# Patient Record
Sex: FEMALE | Race: WHITE | NOT HISPANIC OR LATINO | Employment: OTHER | URBAN - METROPOLITAN AREA
[De-identification: names, ages, dates, MRNs, and addresses within clinical notes are randomized per-mention and may not be internally consistent; named-entity substitution may affect disease eponyms.]

---

## 2023-02-07 PROBLEM — R32 URINARY INCONTINENCE: Status: ACTIVE | Noted: 2023-02-07

## 2023-02-07 PROBLEM — M02.30 REACTIVE ARTHRITIS (MULTI): Status: ACTIVE | Noted: 2023-02-07

## 2023-02-07 PROBLEM — R91.1 PULMONARY NODULE: Status: ACTIVE | Noted: 2023-02-07

## 2023-02-07 PROBLEM — E78.5 HYPERLIPIDEMIA: Status: ACTIVE | Noted: 2023-02-07

## 2023-02-07 PROBLEM — T78.3XXA ANGIOEDEMA: Status: ACTIVE | Noted: 2023-02-07

## 2023-02-07 PROBLEM — M35.3 POLYMYALGIA RHEUMATICA (MULTI): Status: ACTIVE | Noted: 2023-02-07

## 2023-02-07 PROBLEM — M51.36 DEGENERATION OF INTERVERTEBRAL DISC OF LUMBAR REGION: Status: ACTIVE | Noted: 2023-02-07

## 2023-02-07 PROBLEM — R21 RASH: Status: ACTIVE | Noted: 2023-02-07

## 2023-02-07 PROBLEM — K62.5 RECTAL BLEEDING: Status: ACTIVE | Noted: 2023-02-07

## 2023-02-07 PROBLEM — N28.9 RENAL INSUFFICIENCY: Status: ACTIVE | Noted: 2023-02-07

## 2023-02-07 PROBLEM — E03.9 HYPOTHYROIDISM: Status: ACTIVE | Noted: 2023-02-07

## 2023-02-07 PROBLEM — R42 DIZZINESS: Status: ACTIVE | Noted: 2023-02-07

## 2023-02-07 PROBLEM — L50.8 ACUTE URTICARIA: Status: ACTIVE | Noted: 2023-02-07

## 2023-02-07 PROBLEM — K52.9 ILEITIS: Status: ACTIVE | Noted: 2023-02-07

## 2023-02-07 PROBLEM — R76.8 ANA POSITIVE: Status: ACTIVE | Noted: 2023-02-07

## 2023-02-07 PROBLEM — S32.020A COMPRESSION FRACTURE OF L2 LUMBAR VERTEBRA (MULTI): Status: ACTIVE | Noted: 2023-02-07

## 2023-02-07 PROBLEM — N39.0 ACUTE UTI: Status: ACTIVE | Noted: 2023-02-07

## 2023-02-07 PROBLEM — M19.049 ARTHROPATHY OF HAND: Status: ACTIVE | Noted: 2023-02-07

## 2023-02-07 PROBLEM — M54.16 CHRONIC LUMBAR RADICULOPATHY: Status: ACTIVE | Noted: 2023-02-07

## 2023-02-07 PROBLEM — R15.9 FECAL INCONTINENCE: Status: ACTIVE | Noted: 2023-02-07

## 2023-02-07 PROBLEM — U07.1 COVID-19: Status: ACTIVE | Noted: 2023-02-07

## 2023-02-07 PROBLEM — M81.0 OSTEOPOROSIS: Status: ACTIVE | Noted: 2023-02-07

## 2023-02-07 PROBLEM — M12.9 ARTHROPATHY: Status: ACTIVE | Noted: 2023-02-07

## 2023-02-07 PROBLEM — Z86.79 HISTORY OF PSVT (PAROXYSMAL SUPRAVENTRICULAR TACHYCARDIA): Status: ACTIVE | Noted: 2023-02-07

## 2023-02-07 PROBLEM — Z86.19 HISTORY OF SEPSIS: Status: ACTIVE | Noted: 2023-02-07

## 2023-02-07 PROBLEM — E55.9 VITAMIN D DEFICIENCY: Status: ACTIVE | Noted: 2023-02-07

## 2023-02-07 PROBLEM — M51.369 DEGENERATION OF INTERVERTEBRAL DISC OF LUMBAR REGION: Status: ACTIVE | Noted: 2023-02-07

## 2023-02-07 PROBLEM — K21.9 GERD (GASTROESOPHAGEAL REFLUX DISEASE): Status: ACTIVE | Noted: 2023-02-07

## 2023-02-07 PROBLEM — M47.816 LUMBAR SPONDYLOSIS: Status: ACTIVE | Noted: 2023-02-07

## 2023-02-07 PROBLEM — A04.9 BACTERIAL ENTEROCOLITIS: Status: ACTIVE | Noted: 2023-02-07

## 2023-02-07 PROBLEM — M47.816 ARTHRITIS OF LUMBAR SPINE: Status: ACTIVE | Noted: 2023-02-07

## 2023-02-07 PROBLEM — K63.8219 SMALL INTESTINAL BACTERIAL OVERGROWTH: Status: ACTIVE | Noted: 2023-02-07

## 2023-02-07 PROBLEM — S99.929A FOOT INJURY: Status: ACTIVE | Noted: 2023-02-07

## 2023-02-07 PROBLEM — R10.9 ABDOMINAL PAIN: Status: ACTIVE | Noted: 2023-02-07

## 2023-02-07 PROBLEM — R00.0 RAPID HEARTBEAT: Status: ACTIVE | Noted: 2023-02-07

## 2023-02-07 PROBLEM — M79.7 FIBROMYALGIA: Status: ACTIVE | Noted: 2023-02-07

## 2023-02-07 PROBLEM — R25.2 CRAMPING OF HANDS: Status: ACTIVE | Noted: 2023-02-07

## 2023-02-07 PROBLEM — M70.62 TROCHANTERIC BURSITIS OF LEFT HIP: Status: ACTIVE | Noted: 2023-02-07

## 2023-02-07 PROBLEM — R92.8 ABNORMAL MAMMOGRAM: Status: ACTIVE | Noted: 2023-02-07

## 2023-02-07 PROBLEM — K52.9 CHRONIC DIARRHEA: Status: ACTIVE | Noted: 2023-02-07

## 2023-02-07 PROBLEM — R19.4 CHANGE IN BOWEL HABITS: Status: ACTIVE | Noted: 2023-02-07

## 2023-02-07 RX ORDER — ESTRADIOL 0.1 MG/G
CREAM VAGINAL
COMMUNITY
Start: 2022-01-04 | End: 2023-12-19

## 2023-02-07 RX ORDER — DEXTROAMPHETAMINE SACCHARATE, AMPHETAMINE ASPARTATE, DEXTROAMPHETAMINE SULFATE AND AMPHETAMINE SULFATE 7.5; 7.5; 7.5; 7.5 MG/1; MG/1; MG/1; MG/1
1 TABLET ORAL DAILY
COMMUNITY

## 2023-02-07 RX ORDER — BUPROPION HYDROCHLORIDE 300 MG/1
1 TABLET ORAL DAILY
COMMUNITY

## 2023-02-07 RX ORDER — TRIMETHOPRIM 100 MG/1
1 TABLET ORAL DAILY
COMMUNITY
Start: 2022-01-04 | End: 2024-05-28

## 2023-02-07 RX ORDER — OMEPRAZOLE 20 MG/1
1 TABLET, DELAYED RELEASE ORAL DAILY
COMMUNITY
Start: 2022-07-01 | End: 2023-11-07 | Stop reason: WASHOUT

## 2023-02-07 RX ORDER — CLONAZEPAM 2 MG/1
1 TABLET ORAL 3 TIMES DAILY
COMMUNITY

## 2023-02-07 RX ORDER — LOPERAMIDE HYDROCHLORIDE 2 MG/1
2 CAPSULE ORAL 4 TIMES DAILY PRN
COMMUNITY
Start: 2020-12-21 | End: 2023-10-18

## 2023-02-07 RX ORDER — LEVOTHYROXINE SODIUM 25 UG/1
1 TABLET ORAL DAILY
COMMUNITY
Start: 2021-10-10

## 2023-02-07 RX ORDER — VIT C/E/ZN/COPPR/LUTEIN/ZEAXAN 250MG-90MG
1 CAPSULE ORAL DAILY
COMMUNITY
Start: 2021-10-08

## 2023-02-07 RX ORDER — MONTELUKAST SODIUM 4 MG/1
2 TABLET, CHEWABLE ORAL 2 TIMES DAILY
COMMUNITY
Start: 2021-12-28 | End: 2023-10-25

## 2023-02-07 RX ORDER — PROPRANOLOL HYDROCHLORIDE 10 MG/1
1 TABLET ORAL DAILY
COMMUNITY
Start: 2019-09-04 | End: 2023-10-26

## 2023-02-07 RX ORDER — DEXTROAMPHETAMINE SACCHARATE, AMPHETAMINE ASPARTATE MONOHYDRATE, DEXTROAMPHETAMINE SULFATE AND AMPHETAMINE SULFATE 6.25; 6.25; 6.25; 6.25 MG/1; MG/1; MG/1; MG/1
25 CAPSULE, EXTENDED RELEASE ORAL DAILY
COMMUNITY
End: 2023-03-20 | Stop reason: ALTCHOICE

## 2023-02-07 RX ORDER — FLUTICASONE PROPIONATE 50 MCG
1-2 SPRAY, SUSPENSION (ML) NASAL DAILY
COMMUNITY
End: 2023-11-07 | Stop reason: WASHOUT

## 2023-02-07 RX ORDER — ROPINIROLE 2 MG/1
1 TABLET, FILM COATED ORAL NIGHTLY
COMMUNITY

## 2023-02-07 RX ORDER — ARMODAFINIL 250 MG/1
250 TABLET ORAL DAILY
COMMUNITY

## 2023-02-07 RX ORDER — PREGABALIN 100 MG/1
100 CAPSULE ORAL DAILY
COMMUNITY

## 2023-02-07 RX ORDER — DULOXETIN HYDROCHLORIDE 60 MG/1
2 CAPSULE, DELAYED RELEASE ORAL NIGHTLY
COMMUNITY

## 2023-02-07 RX ORDER — TRAZODONE HYDROCHLORIDE 50 MG/1
1 TABLET ORAL NIGHTLY
COMMUNITY

## 2023-03-06 PROBLEM — K22.70 BARRETT'S ESOPHAGUS WITHOUT DYSPLASIA: Status: ACTIVE | Noted: 2023-03-06

## 2023-03-20 ENCOUNTER — OFFICE VISIT (OUTPATIENT)
Dept: PRIMARY CARE | Facility: CLINIC | Age: 68
End: 2023-03-20
Payer: MEDICARE

## 2023-03-20 VITALS
HEART RATE: 96 BPM | RESPIRATION RATE: 16 BRPM | BODY MASS INDEX: 25.87 KG/M2 | HEIGHT: 63 IN | OXYGEN SATURATION: 98 % | DIASTOLIC BLOOD PRESSURE: 68 MMHG | WEIGHT: 146 LBS | SYSTOLIC BLOOD PRESSURE: 108 MMHG

## 2023-03-20 DIAGNOSIS — M35.3 POLYMYALGIA RHEUMATICA (MULTI): ICD-10-CM

## 2023-03-20 DIAGNOSIS — R55 SYNCOPE, UNSPECIFIED SYNCOPE TYPE: ICD-10-CM

## 2023-03-20 DIAGNOSIS — F90.9 ATTENTION DEFICIT HYPERACTIVITY DISORDER (ADHD), UNSPECIFIED ADHD TYPE: ICD-10-CM

## 2023-03-20 DIAGNOSIS — Z00.00 MEDICARE ANNUAL WELLNESS VISIT, SUBSEQUENT: Primary | ICD-10-CM

## 2023-03-20 DIAGNOSIS — Z12.31 SCREENING MAMMOGRAM, ENCOUNTER FOR: ICD-10-CM

## 2023-03-20 DIAGNOSIS — K21.9 GASTROESOPHAGEAL REFLUX DISEASE WITHOUT ESOPHAGITIS: ICD-10-CM

## 2023-03-20 DIAGNOSIS — Z13.220 SCREENING, LIPID: ICD-10-CM

## 2023-03-20 PROBLEM — S32.020A COMPRESSION FRACTURE OF L2 LUMBAR VERTEBRA (MULTI): Status: RESOLVED | Noted: 2023-02-07 | Resolved: 2023-03-20

## 2023-03-20 PROBLEM — U07.1 COVID-19: Status: RESOLVED | Noted: 2023-02-07 | Resolved: 2023-03-20

## 2023-03-20 PROCEDURE — 99214 OFFICE O/P EST MOD 30 MIN: CPT | Performed by: STUDENT IN AN ORGANIZED HEALTH CARE EDUCATION/TRAINING PROGRAM

## 2023-03-20 PROCEDURE — 1170F FXNL STATUS ASSESSED: CPT | Performed by: STUDENT IN AN ORGANIZED HEALTH CARE EDUCATION/TRAINING PROGRAM

## 2023-03-20 PROCEDURE — 4004F PT TOBACCO SCREEN RCVD TLK: CPT | Performed by: STUDENT IN AN ORGANIZED HEALTH CARE EDUCATION/TRAINING PROGRAM

## 2023-03-20 PROCEDURE — 1160F RVW MEDS BY RX/DR IN RCRD: CPT | Performed by: STUDENT IN AN ORGANIZED HEALTH CARE EDUCATION/TRAINING PROGRAM

## 2023-03-20 PROCEDURE — 1159F MED LIST DOCD IN RCRD: CPT | Performed by: STUDENT IN AN ORGANIZED HEALTH CARE EDUCATION/TRAINING PROGRAM

## 2023-03-20 PROCEDURE — G0439 PPPS, SUBSEQ VISIT: HCPCS | Performed by: STUDENT IN AN ORGANIZED HEALTH CARE EDUCATION/TRAINING PROGRAM

## 2023-03-20 RX ORDER — DEXTROAMPHETAMINE SACCHARATE, AMPHETAMINE ASPARTATE MONOHYDRATE, DEXTROAMPHETAMINE SULFATE AND AMPHETAMINE SULFATE 6.25; 6.25; 6.25; 6.25 MG/1; MG/1; MG/1; MG/1
25 CAPSULE, EXTENDED RELEASE ORAL DAILY
Start: 2023-03-20 | End: 2023-03-20 | Stop reason: ENTERED-IN-ERROR

## 2023-03-20 RX ORDER — OMEPRAZOLE 20 MG/1
1 CAPSULE, DELAYED RELEASE ORAL DAILY
COMMUNITY
Start: 2022-10-17 | End: 2023-11-07 | Stop reason: WASHOUT

## 2023-03-20 RX ORDER — ZOLEDRONIC ACID 5 MG/100ML
INJECTION, SOLUTION INTRAVENOUS
COMMUNITY

## 2023-03-20 RX ORDER — DEXTROAMPHETAMINE SACCHARATE, AMPHETAMINE ASPARTATE, DEXTROAMPHETAMINE SULFATE AND AMPHETAMINE SULFATE 3.75; 3.75; 3.75; 3.75 MG/1; MG/1; MG/1; MG/1
TABLET ORAL
COMMUNITY
Start: 2023-02-16 | End: 2023-03-20 | Stop reason: ALTCHOICE

## 2023-03-20 ASSESSMENT — PATIENT HEALTH QUESTIONNAIRE - PHQ9
1. LITTLE INTEREST OR PLEASURE IN DOING THINGS: NOT AT ALL
1. LITTLE INTEREST OR PLEASURE IN DOING THINGS: NOT AT ALL
SUM OF ALL RESPONSES TO PHQ9 QUESTIONS 1 AND 2: 0
2. FEELING DOWN, DEPRESSED OR HOPELESS: NOT AT ALL
2. FEELING DOWN, DEPRESSED OR HOPELESS: NOT AT ALL
SUM OF ALL RESPONSES TO PHQ9 QUESTIONS 1 AND 2: 0

## 2023-03-20 ASSESSMENT — ENCOUNTER SYMPTOMS
ROS GI COMMENTS: REFLUX
PALPITATIONS: 0
SEIZURES: 0
DIARRHEA: 1
DECREASED CONCENTRATION: 1
NERVOUS/ANXIOUS: 1
SLEEP DISTURBANCE: 1
DEPRESSION: 0
NAUSEA: 1
FACIAL ASYMMETRY: 0
DIZZINESS: 0
FATIGUE: 1
CONFUSION: 0
COUGH: 0
ACTIVITY CHANGE: 0
LOSS OF SENSATION IN FEET: 0
APPETITE CHANGE: 0
SPEECH DIFFICULTY: 0
DYSPHORIC MOOD: 1
SHORTNESS OF BREATH: 0
NUMBNESS: 0
OCCASIONAL FEELINGS OF UNSTEADINESS: 0

## 2023-03-20 ASSESSMENT — ACTIVITIES OF DAILY LIVING (ADL)
DRESSING: INDEPENDENT
TAKING_MEDICATION: INDEPENDENT
BATHING: INDEPENDENT
MANAGING_FINANCES: INDEPENDENT
DOING_HOUSEWORK: INDEPENDENT
GROCERY_SHOPPING: INDEPENDENT

## 2023-03-20 NOTE — ASSESSMENT & PLAN NOTE
No further episodes  No seizure like activity warranting EEG  Has been cleared by cardiology dept-normal stress  Discussion of medications and the need to discuss with psychiatry and sleep teams  Given hx of hypersomnia and use of multiple stimulant and sedating agents leading differential is polypharmacy  Recommendations to have someone drive her until workup complete
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
## 2023-03-20 NOTE — ASSESSMENT & PLAN NOTE
Mammogram ordered  Colonoscopy due 1/24/2024  Encouraged zoster vaccine- agrees  Declines PCV20  Declines Bone Density Scan  Due for lipid panel, ordered   Patient declines 6 month follow up wants to do annual

## 2023-03-20 NOTE — ASSESSMENT & PLAN NOTE
Increase omeprazole to 40mg  EGD upcoming  Lifestyle modifications discussed with patient including:   Decreasing exacerbating foods including citric, acidic, spicy foods  Elevated head of the bed structurally or with pillows  Not eating 2 hours before laying down  Long term potential risks of PPIs discussed including C diff, pneumonia, B12 deficiency discussed

## 2023-04-04 ENCOUNTER — HOSPITAL ENCOUNTER (OUTPATIENT)
Dept: DATA CONVERSION | Facility: HOSPITAL | Age: 68
End: 2023-04-04
Attending: INTERNAL MEDICINE | Admitting: INTERNAL MEDICINE
Payer: MEDICARE

## 2023-04-04 DIAGNOSIS — F32.A DEPRESSION, UNSPECIFIED: ICD-10-CM

## 2023-04-04 DIAGNOSIS — R12 HEARTBURN: ICD-10-CM

## 2023-04-04 DIAGNOSIS — F41.9 ANXIETY DISORDER, UNSPECIFIED: ICD-10-CM

## 2023-04-04 DIAGNOSIS — M79.7 FIBROMYALGIA: ICD-10-CM

## 2023-04-04 DIAGNOSIS — R42 DIZZINESS AND GIDDINESS: ICD-10-CM

## 2023-04-04 DIAGNOSIS — Z90.49 ACQUIRED ABSENCE OF OTHER SPECIFIED PARTS OF DIGESTIVE TRACT: ICD-10-CM

## 2023-04-04 DIAGNOSIS — F90.9 ATTENTION-DEFICIT HYPERACTIVITY DISORDER, UNSPECIFIED TYPE: ICD-10-CM

## 2023-04-04 DIAGNOSIS — G47.00 INSOMNIA, UNSPECIFIED: ICD-10-CM

## 2023-04-04 DIAGNOSIS — F17.200 NICOTINE DEPENDENCE, UNSPECIFIED, UNCOMPLICATED: ICD-10-CM

## 2023-04-04 DIAGNOSIS — G25.81 RESTLESS LEGS SYNDROME: ICD-10-CM

## 2023-04-04 DIAGNOSIS — E78.5 HYPERLIPIDEMIA, UNSPECIFIED: ICD-10-CM

## 2023-04-04 DIAGNOSIS — K21.9 GASTRO-ESOPHAGEAL REFLUX DISEASE WITHOUT ESOPHAGITIS: ICD-10-CM

## 2023-04-04 DIAGNOSIS — E03.9 HYPOTHYROIDISM, UNSPECIFIED: ICD-10-CM

## 2023-04-04 DIAGNOSIS — K29.50 UNSPECIFIED CHRONIC GASTRITIS WITHOUT BLEEDING: ICD-10-CM

## 2023-04-11 LAB
COMPLETE PATHOLOGY REPORT: NORMAL
CONVERTED CLINICAL DIAGNOSIS-HISTORY: NORMAL
CONVERTED FINAL DIAGNOSIS: NORMAL
CONVERTED FINAL REPORT PDF LINK TO COPY AND PASTE: NORMAL
CONVERTED GROSS DESCRIPTION: NORMAL

## 2023-08-17 LAB
ALANINE AMINOTRANSFERASE (SGPT) (U/L) IN SER/PLAS: 21 U/L (ref 7–45)
ALBUMIN (G/DL) IN SER/PLAS: 4.1 G/DL (ref 3.4–5)
ALKALINE PHOSPHATASE (U/L) IN SER/PLAS: 88 U/L (ref 33–136)
ANION GAP IN SER/PLAS: 12 MMOL/L (ref 10–20)
ASPARTATE AMINOTRANSFERASE (SGOT) (U/L) IN SER/PLAS: 16 U/L (ref 9–39)
BILIRUBIN TOTAL (MG/DL) IN SER/PLAS: 0.3 MG/DL (ref 0–1.2)
C REACTIVE PROTEIN (MG/L) IN SER/PLAS: 1.17 MG/DL
CALCIDIOL (25 OH VITAMIN D3) (NG/ML) IN SER/PLAS: 24 NG/ML
CALCIUM (MG/DL) IN SER/PLAS: 9.3 MG/DL (ref 8.6–10.3)
CARBON DIOXIDE, TOTAL (MMOL/L) IN SER/PLAS: 27 MMOL/L (ref 21–32)
CHLORIDE (MMOL/L) IN SER/PLAS: 104 MMOL/L (ref 98–107)
CREATININE (MG/DL) IN SER/PLAS: 0.85 MG/DL (ref 0.5–1.05)
GFR FEMALE: 75 ML/MIN/1.73M2
GLUCOSE (MG/DL) IN SER/PLAS: 88 MG/DL (ref 74–99)
POTASSIUM (MMOL/L) IN SER/PLAS: 4.5 MMOL/L (ref 3.5–5.3)
PROTEIN TOTAL: 6.6 G/DL (ref 6.4–8.2)
SEDIMENTATION RATE, ERYTHROCYTE: 14 MM/H (ref 0–30)
SODIUM (MMOL/L) IN SER/PLAS: 138 MMOL/L (ref 136–145)
UREA NITROGEN (MG/DL) IN SER/PLAS: 15 MG/DL (ref 6–23)

## 2023-08-18 LAB — PARATHYRIN INTACT (PG/ML) IN SER/PLAS: 77.8 PG/ML (ref 18.5–88)

## 2023-09-06 VITALS — BODY MASS INDEX: 24.41 KG/M2 | WEIGHT: 137.79 LBS | HEIGHT: 63 IN

## 2023-10-18 DIAGNOSIS — K21.9 GASTROESOPHAGEAL REFLUX DISEASE WITHOUT ESOPHAGITIS: ICD-10-CM

## 2023-10-18 DIAGNOSIS — K59.1 FUNCTIONAL DIARRHEA: Primary | ICD-10-CM

## 2023-10-18 DIAGNOSIS — R15.1 FECAL SOILING: Primary | ICD-10-CM

## 2023-10-18 DIAGNOSIS — Z86.79 HISTORY OF PSVT (PAROXYSMAL SUPRAVENTRICULAR TACHYCARDIA): Primary | ICD-10-CM

## 2023-10-18 RX ORDER — LOPERAMIDE HYDROCHLORIDE 2 MG/1
CAPSULE ORAL
Qty: 80 CAPSULE | Refills: 0 | Status: SHIPPED | OUTPATIENT
Start: 2023-10-18 | End: 2023-12-13

## 2023-10-25 RX ORDER — MONTELUKAST SODIUM 4 MG/1
2 TABLET, CHEWABLE ORAL 2 TIMES DAILY
Qty: 120 TABLET | Refills: 0 | Status: SHIPPED | OUTPATIENT
Start: 2023-10-25 | End: 2023-10-31

## 2023-10-25 RX ORDER — PANTOPRAZOLE SODIUM 40 MG/1
40 TABLET, DELAYED RELEASE ORAL DAILY
Qty: 30 TABLET | Refills: 0 | Status: SHIPPED | OUTPATIENT
Start: 2023-10-25 | End: 2023-12-08 | Stop reason: SDUPTHER

## 2023-10-26 RX ORDER — PROPRANOLOL HYDROCHLORIDE 10 MG/1
10 TABLET ORAL DAILY
Qty: 90 TABLET | Refills: 0 | Status: SHIPPED | OUTPATIENT
Start: 2023-10-26 | End: 2024-02-19 | Stop reason: SDUPTHER

## 2023-11-02 DIAGNOSIS — R15.9 FULL INCONTINENCE OF FECES: ICD-10-CM

## 2023-11-02 RX ORDER — DIPHENOXYLATE HYDROCHLORIDE AND ATROPINE SULFATE 2.5; .025 MG/1; MG/1
1 TABLET ORAL 2 TIMES DAILY PRN
Qty: 60 TABLET | Refills: 0 | Status: SHIPPED | OUTPATIENT
Start: 2023-11-02 | End: 2023-12-02

## 2023-11-06 NOTE — PROGRESS NOTES
[facility]: GENERAL SURGERY  PROGRESS NOTE      Sabina Chopra is a 68 y.o. female that is presenting today for Follow-up.    ASSESSMENT / PLAN:  Diagnoses and all orders for this visit:  Full incontinence of feces    Believe she has reached maximal medical treatment  Patient refuses referral to pelvic floor although I believe this in addition to augmentation of her InterStim may help with her control.    InterStim is currently off, she has an appointment with her urologist and the rep to see if they can find a setting that improves her symptoms.  Believes enemas may be too difficult to perform as needed  Not interested in ostomy as an alternative at this time  She is interested in the possibility of a sphincteroplasty however is aware that there is a high short-term failure rate.  I recommended she be evaluated a pelvic floor specialty center if this is something she is interested in pursuing.    Patient Active Problem List   Diagnosis    Abdominal pain    Abnormal mammogram    Acute urticaria    Acute UTI    SERENA positive    Angioedema    Arthropathy of hand    Arthropathy    Bacterial enterocolitis    Change in bowel habits    Fecal incontinence    Urinary incontinence    Chronic diarrhea    Cramping of hands    Degeneration of intervertebral disc of lumbar region    Dizziness    Foot injury    GERD (gastroesophageal reflux disease)    History of PSVT (paroxysmal supraventricular tachycardia)    History of sepsis    Hyperlipidemia    Hypothyroidism    Ileitis    Arthritis of lumbar spine    Chronic lumbar radiculopathy    Lumbar spondylosis    Osteoporosis    Fibromyalgia    Polymyalgia rheumatica (CMS/HCC)    Pulmonary nodule    Rapid heartbeat    Rash    Rectal bleeding    Renal insufficiency    Small intestinal bacterial overgrowth    Trochanteric bursitis of left hip    Vitamin D deficiency    Rose's esophagus without dysplasia    Medicare annual wellness visit, subsequent    Syncope     Subjective    Sabina Chopra is a 68 year old female with history of fecal incontinence.  Previously examined by Dr. Lopez at the [place] office.  History of total abdominal colectomy with ANABELA in 2008 for colonic dysmotility.  Developed incontinence after surgery.  InterStim placement 2009.  She reported good results but developed an infection and had to have the InterStim removed.  Had a new sacral nerve modulator placed 2021 but had suboptimal results.    Colonoscopy 2019. Apthous ulcers in the neoterminal ileum, evidence of a prior end to end ileorectal anastomosis at 20 cm to the anus. Diminutive polyp found in the rectum.  Pathology:   Terminal ileum, biopsy:  small bowel mucosa, chronic enteritis/ileitis with focal acute inflammation, and lamina propria fibrosis with overlying re-epithelialization consistent with healing ulcer bed, no dysplasia.  Rectal polyp:  tubular adenoma.    Ongoing episodes of fecal incontinence.  Experiences these multiple times per week.  No warning or sensation before evacuation of stool.  Still taking colestipol Imodium and Lomotil.  Has an appointment regarding her current InterStim which is turned off about adjusting settings for better improvement.  Review of Systems   All other systems reviewed and are negative.     Objective   Vitals:    11/07/23 1413   BP: 111/88   Pulse: 77   Temp: 36.1 °C (97 °F)      Physical Exam  Constitutional:       Appearance: Normal appearance.   HENT:      Head: Normocephalic.   Cardiovascular:      Rate and Rhythm: Normal rate and regular rhythm.      Pulses: Normal pulses.   Pulmonary:      Effort: Pulmonary effort is normal.   Abdominal:      General: Bowel sounds are normal.      Palpations: Abdomen is soft.   Musculoskeletal:         General: Normal range of motion.   Skin:     General: Skin is warm.   Neurological:      General: No focal deficit present.      Mental Status: She is alert.   Psychiatric:         Mood and Affect: Mood normal.         Behavior:  "Behavior normal.         Diagnostic Results   Lab Results   Component Value Date    GLUCOSE 88 08/17/2023    CALCIUM 9.3 08/17/2023     08/17/2023    K 4.5 08/17/2023    CO2 27 08/17/2023     08/17/2023    BUN 15 08/17/2023    CREATININE 0.85 08/17/2023     Lab Results   Component Value Date    ALT 21 08/17/2023    AST 16 08/17/2023    ALKPHOS 88 08/17/2023    BILITOT 0.3 08/17/2023     Lab Results   Component Value Date    WBC 9.7 12/27/2022    HGB 13.7 12/27/2022    HCT 42.8 12/27/2022     (H) 12/27/2022     12/27/2022     No results found for: \"CHOL\"  No results found for: \"HDL\"  No results found for: \"LDLCALC\"  No results found for: \"TRIG\"  No components found for: \"CHOLHDL\"  No results found for: \"HGBA1C\"  Other labs not included in the list above were reviewed either before or during this encounter.    History    Past Medical History:   Diagnosis Date    Compression fracture of L2 lumbar vertebra (CMS/HCC) 02/07/2023    COVID-19 02/07/2023    Hypersomnia, unspecified     Hypersomnia    Personal history of other diseases of the digestive system     History of colitis    Personal history of other diseases of the musculoskeletal system and connective tissue     History of fibromyalgia    Personal history of other diseases of the musculoskeletal system and connective tissue     History of arthritis    Personal history of other diseases of the nervous system and sense organs     History of restless legs syndrome    Personal history of other endocrine, nutritional and metabolic disease     History of thyroid disorder    Personal history of other mental and behavioral disorders     History of anxiety    Personal history of other mental and behavioral disorders     History of depression    Personal history of other specified conditions     History of insomnia    Personal history of other specified conditions     History of incontinence of feces    Syncope and collapse 09/04/2019    Syncope " and collapse     Past Surgical History:   Procedure Laterality Date    OTHER SURGICAL HISTORY  11/19/2018    Hernia repair    OTHER SURGICAL HISTORY  11/19/2018    Elbow surgery    OTHER SURGICAL HISTORY  11/19/2018    Hand surgery    OTHER SURGICAL HISTORY  11/14/2018    Colectomy total     Family History   Problem Relation Name Age of Onset    Diabetes Sister      Breast cancer Other grandmother      Allergies   Allergen Reactions    Adhesive Unknown and Rash     Adhesive bandages MISC    Cefaclor Unknown    Famotidine Unknown    Hydromorphone Unknown    Metronidazole Unknown    Sulfamethoxazole-Trimethoprim Rash     Current Outpatient Medications on File Prior to Visit   Medication Sig Dispense Refill    amphetamine-dextroamphetamine (Adderall) 30 mg tablet Take 1 tablet (30 mg) by mouth once daily.      armodafinil (Nuvigil) 250 mg tablet Take 1 tablet (250 mg) by mouth once daily.      buPROPion XL (Wellbutrin XL) 300 mg 24 hr tablet Take 1 tablet (300 mg) by mouth once daily.      cholecalciferol (Vitamin D-3) 25 MCG (1000 UT) capsule Take 1 capsule (25 mcg) by mouth once daily.      clonazePAM (KlonoPIN) 2 mg tablet Take 1 tablet (2 mg) by mouth 3 times a day.      colestipol (Colestid) 1 gram tablet TAKE 2 TABLETS BY MOUTH TWICE DAILY 120 tablet 0    diphenoxylate-atropine (LomotiL) 2.5-0.025 mg tablet Take 1 tablet by mouth 2 times a day as needed for diarrhea. 60 tablet 0    DULoxetine (Cymbalta) 60 mg DR capsule Take 2 capsules (120 mg) by mouth once daily at bedtime.      estradiol (Estrace) 0.01 % (0.1 mg/gram) vaginal cream Insert into the vagina. INSERT pea size amount INTRAVAGINALLY AT BEDTIME NIGHTLY x 14 days; then 2 times per week      levothyroxine (Synthroid, Levoxyl) 25 mcg tablet Take 1 tablet (25 mcg) by mouth once daily.      loperamide (Imodium) 2 mg capsule TAKE ONE CAPSULE BY MOUTH FOUR TIMES A DAY AS NEEDED 80 capsule 0    pantoprazole (ProtoNix) 40 mg EC tablet TAKE ONE TABLET BY MOUTH  DAILY 30 tablet 0    pregabalin (Lyrica) 100 mg capsule Take 1 capsule (100 mg) by mouth once daily.      propranolol (Inderal) 10 mg tablet TAKE ONE TABLET BY MOUTH EVERY DAY 90 tablet 0    rOPINIRole (Requip) 2 mg tablet Take 1 tablet (2 mg) by mouth once daily at bedtime.      traZODone (Desyrel) 50 mg tablet Take 1 tablet (50 mg) by mouth once daily at bedtime.      trimethoprim (Trimpex) 100 mg tablet Take 1 tablet (100 mg) by mouth once daily.      fluticasone (Flonase) 50 mcg/actuation nasal spray Administer 1-2 sprays into each nostril once daily.      omeprazole (PriLOSEC) 20 mg DR capsule Take 1 capsule (20 mg) by mouth once daily.      omeprazole OTC (PriLOSEC OTC) 20 mg EC tablet Take 1 tablet (20 mg) by mouth once daily.      triamcinolone acetonide (KENALOG) 40 mg/mL kit 1 mL (40 mg). Every 4 weeks      zoledronic acid (Reclast) 5 mg/100 mL piggyback Infuse into a venous catheter.       No current facility-administered medications on file prior to visit.     Immunization History   Administered Date(s) Administered    Flu vaccine, quadrivalent, high-dose, preservative free, age 65y+ (FLUZONE) 11/10/2020    Influenza, High Dose Seasonal, Preservative Free 11/23/2021    Influenza, injectable, MDCK, quadrivalent 10/25/2019    Influenza, injectable, quadrivalent 09/25/2017    Influenza, seasonal, injectable 10/07/2016, 12/21/2022    Influenza, seasonal, injectable, preservative free 10/15/2018    Pfizer Purple Cap SARS-CoV-2 03/11/2021, 04/15/2021    Pneumococcal conjugate vaccine, 13-valent (PREVNAR 13) 11/10/2020    Tdap vaccine, age 7 year and older (BOOSTRIX) 08/29/2019, 08/04/2022

## 2023-11-07 ENCOUNTER — OFFICE VISIT (OUTPATIENT)
Dept: SURGERY | Facility: CLINIC | Age: 68
End: 2023-11-07
Payer: MEDICARE

## 2023-11-07 VITALS
BODY MASS INDEX: 24.45 KG/M2 | TEMPERATURE: 97 F | WEIGHT: 138 LBS | SYSTOLIC BLOOD PRESSURE: 111 MMHG | DIASTOLIC BLOOD PRESSURE: 88 MMHG | HEART RATE: 77 BPM

## 2023-11-07 DIAGNOSIS — R15.9 FULL INCONTINENCE OF FECES: Primary | ICD-10-CM

## 2023-11-07 PROCEDURE — 1160F RVW MEDS BY RX/DR IN RCRD: CPT | Performed by: SURGERY

## 2023-11-07 PROCEDURE — 99213 OFFICE O/P EST LOW 20 MIN: CPT | Performed by: SURGERY

## 2023-11-07 PROCEDURE — 1125F AMNT PAIN NOTED PAIN PRSNT: CPT | Performed by: SURGERY

## 2023-11-07 PROCEDURE — 1159F MED LIST DOCD IN RCRD: CPT | Performed by: SURGERY

## 2023-11-07 PROCEDURE — 4004F PT TOBACCO SCREEN RCVD TLK: CPT | Performed by: SURGERY

## 2023-11-14 ENCOUNTER — OFFICE VISIT (OUTPATIENT)
Dept: UROLOGY | Facility: CLINIC | Age: 68
End: 2023-11-14
Payer: MEDICARE

## 2023-11-14 DIAGNOSIS — R15.9 INCONTINENCE OF FECES, UNSPECIFIED FECAL INCONTINENCE TYPE: Primary | ICD-10-CM

## 2023-11-14 PROCEDURE — 1159F MED LIST DOCD IN RCRD: CPT | Performed by: STUDENT IN AN ORGANIZED HEALTH CARE EDUCATION/TRAINING PROGRAM

## 2023-11-14 PROCEDURE — 99215 OFFICE O/P EST HI 40 MIN: CPT | Performed by: STUDENT IN AN ORGANIZED HEALTH CARE EDUCATION/TRAINING PROGRAM

## 2023-11-14 PROCEDURE — 4004F PT TOBACCO SCREEN RCVD TLK: CPT | Performed by: STUDENT IN AN ORGANIZED HEALTH CARE EDUCATION/TRAINING PROGRAM

## 2023-11-14 PROCEDURE — 1125F AMNT PAIN NOTED PAIN PRSNT: CPT | Performed by: STUDENT IN AN ORGANIZED HEALTH CARE EDUCATION/TRAINING PROGRAM

## 2023-11-14 PROCEDURE — 1160F RVW MEDS BY RX/DR IN RCRD: CPT | Performed by: STUDENT IN AN ORGANIZED HEALTH CARE EDUCATION/TRAINING PROGRAM

## 2023-11-14 PROCEDURE — 95971 ALYS SMPL SP/PN NPGT W/PRGRM: CPT | Performed by: STUDENT IN AN ORGANIZED HEALTH CARE EDUCATION/TRAINING PROGRAM
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
## 2023-12-07 ENCOUNTER — APPOINTMENT (OUTPATIENT)
Dept: CARDIOLOGY | Facility: CLINIC | Age: 68
End: 2023-12-07
Payer: MEDICARE

## 2023-12-07 DIAGNOSIS — R15.1 FECAL SOILING: ICD-10-CM

## 2023-12-08 ENCOUNTER — OFFICE VISIT (OUTPATIENT)
Dept: GASTROENTEROLOGY | Facility: CLINIC | Age: 68
End: 2023-12-08
Payer: MEDICARE

## 2023-12-08 VITALS
HEART RATE: 97 BPM | SYSTOLIC BLOOD PRESSURE: 113 MMHG | DIASTOLIC BLOOD PRESSURE: 80 MMHG | WEIGHT: 142 LBS | BODY MASS INDEX: 25.15 KG/M2

## 2023-12-08 DIAGNOSIS — K21.9 GASTROESOPHAGEAL REFLUX DISEASE WITHOUT ESOPHAGITIS: ICD-10-CM

## 2023-12-08 DIAGNOSIS — E73.9 ADULT LACTASE DEFICIENCY: ICD-10-CM

## 2023-12-08 DIAGNOSIS — K21.9 GASTROESOPHAGEAL REFLUX DISEASE WITHOUT ESOPHAGITIS: Primary | ICD-10-CM

## 2023-12-08 DIAGNOSIS — K59.1 FUNCTIONAL DIARRHEA: ICD-10-CM

## 2023-12-08 PROCEDURE — 1159F MED LIST DOCD IN RCRD: CPT | Performed by: INTERNAL MEDICINE

## 2023-12-08 PROCEDURE — 1160F RVW MEDS BY RX/DR IN RCRD: CPT | Performed by: INTERNAL MEDICINE

## 2023-12-08 PROCEDURE — 1125F AMNT PAIN NOTED PAIN PRSNT: CPT | Performed by: INTERNAL MEDICINE

## 2023-12-08 PROCEDURE — 99214 OFFICE O/P EST MOD 30 MIN: CPT | Performed by: INTERNAL MEDICINE

## 2023-12-08 PROCEDURE — 4004F PT TOBACCO SCREEN RCVD TLK: CPT | Performed by: INTERNAL MEDICINE

## 2023-12-08 RX ORDER — PANTOPRAZOLE SODIUM 40 MG/1
40 TABLET, DELAYED RELEASE ORAL DAILY
Qty: 30 TABLET | Refills: 0 | Status: SHIPPED | OUTPATIENT
Start: 2023-12-08 | End: 2024-02-09

## 2023-12-08 RX ORDER — MONTELUKAST SODIUM 4 MG/1
2 TABLET, CHEWABLE ORAL 2 TIMES DAILY
Qty: 120 TABLET | Refills: 0 | Status: SHIPPED | OUTPATIENT
Start: 2023-12-08 | End: 2024-02-09

## 2023-12-08 NOTE — PROGRESS NOTES
Subjective   Patient ID: Sabina Chopra is a 68 y.o. female who presents for Follow-up.  HPI  The patient has a history of fecal incontinence for which she is seeing Dr. Santillan , urogynecologist who is planning placement of device for control of her symptoms.  She also has ongoing difficulties with heartburn occasionally occurring at night over the past several months.  Her symptoms have been generally controlled on pantoprazole 40 mg daily but she has occasional breakthrough symptoms.  In our visit today she also mentioned symptoms of difficulties with diarrhea bloating and cramping after consumption of dairy products.  Descriptively her complaints are consistent with possible lactase deficiency.    Review of Systems  Constitutional: no fever, no chills, fatigue,  not feeling tired and no recent weight loss. No reports of dizziness.  SKIN: No skin rash or lesions  EYE: No pain photophobia, diplopia or blurred vision  EAR: No discharge, pain or hearing loss  NOSE: No congestion, epistaxis or obstruction  RESPIRATORY: No cough , dyspnea or  chest pain   CV: No chest pain, lower extremity swelling or palpitations  GE: No abdominal pain, nausea, vomiting, dysphagia or odynophagia. Denied constipation , diarrhea  : No dysuria or hematuria, urinary incontinence or urine frequency  NEURO: Denied weakness, confusion, dizziness, or numbness   PSYCH : Denies anxiety, hallucinations or insomnia  HEME/ LYMPH : Denied bleeding , bruising or enlarged nodes  ENDOCRINE: No change in weight, polydipsia, or abnormal bleeding  .       Objective   Physical Exam  Head and neck examinations were  unremarkable. There was no temporal wasting. No jaundice noted. No thyromegaly.  No carotid bruits.  There is no peripheral lymphadenopathy.  Lungs were clear to auscultation. There when no rales, rhonchi or wheezes.  Cardiac examination revealed normal heart sounds. Rhythm was sinus . There were no murmurs.  Abdomen was full and soft. There  was no organomegaly. Bowel sounds were normo- active. There was no ascites. The abdomen was nontender.  Rectal examination was not done.  Extremities: No edema or joint swelling.  Neurological examination: Patient was alert, oriented in person place, and time. There when no focal neurological signs. Cranial nerves were grossly intact. No evidence of encephalopathy. There was no asterixis. The  plantar response was flexor.      Assessment/Plan   68-year-old lady with symptoms of fecal incontinence occurring status post subtotal colectomy with ileorectal anastomosis done in 2007 for chronic constipation.  She is being seen by a urogynecologist and there are plans for placement of rectal pessary and possible anal bulking procedure for control of her incontinence.    Today she also described difficulties with consumption of dairy products and I recommended use of  LACTAID pills 2 to 3 tablets as needed with consumption of dairy foods.    I also instructed her on routine antireflux measures including avoidance of cigarette smoking, limiting intake of alcohol, caffeine, peppermints, chocolate, and acidic products such as tomato sauce and juices and also oranges.    She was advised to return to see me 2 to 3 months after her planned procedure with a urogynecologist.           Titus Artis MD 12/08/23 11:04 AM

## 2023-12-13 RX ORDER — LOPERAMIDE HYDROCHLORIDE 2 MG/1
CAPSULE ORAL
Qty: 80 CAPSULE | Refills: 0 | Status: SHIPPED | OUTPATIENT
Start: 2023-12-13

## 2023-12-19 ENCOUNTER — OFFICE VISIT (OUTPATIENT)
Dept: UROLOGY | Facility: CLINIC | Age: 68
End: 2023-12-19
Payer: MEDICARE

## 2023-12-19 DIAGNOSIS — R15.9 INCONTINENCE OF FECES, UNSPECIFIED FECAL INCONTINENCE TYPE: ICD-10-CM

## 2023-12-19 DIAGNOSIS — N95.8 GENITOURINARY SYNDROME OF MENOPAUSE: Primary | ICD-10-CM

## 2023-12-19 PROCEDURE — 1160F RVW MEDS BY RX/DR IN RCRD: CPT | Performed by: STUDENT IN AN ORGANIZED HEALTH CARE EDUCATION/TRAINING PROGRAM

## 2023-12-19 PROCEDURE — 57160 INSERT PESSARY/OTHER DEVICE: CPT | Performed by: STUDENT IN AN ORGANIZED HEALTH CARE EDUCATION/TRAINING PROGRAM

## 2023-12-19 PROCEDURE — 1125F AMNT PAIN NOTED PAIN PRSNT: CPT | Performed by: STUDENT IN AN ORGANIZED HEALTH CARE EDUCATION/TRAINING PROGRAM

## 2023-12-19 PROCEDURE — 99214 OFFICE O/P EST MOD 30 MIN: CPT | Performed by: STUDENT IN AN ORGANIZED HEALTH CARE EDUCATION/TRAINING PROGRAM

## 2023-12-19 PROCEDURE — 4004F PT TOBACCO SCREEN RCVD TLK: CPT | Performed by: STUDENT IN AN ORGANIZED HEALTH CARE EDUCATION/TRAINING PROGRAM

## 2023-12-19 PROCEDURE — 1159F MED LIST DOCD IN RCRD: CPT | Performed by: STUDENT IN AN ORGANIZED HEALTH CARE EDUCATION/TRAINING PROGRAM

## 2023-12-19 RX ORDER — ESTRADIOL 0.1 MG/G
CREAM VAGINAL
Qty: 42.5 G | Refills: 12 | Status: SHIPPED | OUTPATIENT
Start: 2023-12-19

## 2023-12-19 NOTE — PROGRESS NOTES
CHIEF COMPLAINT: FUV Fecal pessary           HISTORY OF PRESENT ILLNESS:  This is a 68 y.o. female,  who presents with a follow up visit for a fecal pessary. Patient is here to learn how to use a fecal pessary today. Patient advised to give it about 6 - 8 pumps when not needing to have a bowel movement. Patient will need to deflate the pessary when she needs to have a bowel movement. Patient is taking the Colestipol daily with 2 mg in the morning and evening each and she is using Imodium too.            HISTORY OF PRESENT ILLNESS:           Past Medical History  She has a past medical history of Compression fracture of L2 lumbar vertebra (CMS/HCC) (02/07/2023), COVID-19 (02/07/2023), Hypersomnia, unspecified, Personal history of other diseases of the digestive system, Personal history of other diseases of the musculoskeletal system and connective tissue, Personal history of other diseases of the musculoskeletal system and connective tissue, Personal history of other diseases of the nervous system and sense organs, Personal history of other endocrine, nutritional and metabolic disease, Personal history of other mental and behavioral disorders, Personal history of other mental and behavioral disorders, Personal history of other specified conditions, Personal history of other specified conditions, and Syncope and collapse (09/04/2019).    Surgical History  She has a past surgical history that includes Other surgical history (11/19/2018); Other surgical history (11/19/2018); Other surgical history (11/19/2018); and Other surgical history (11/14/2018).     Social History  She reports that she has been smoking cigarettes. She has been exposed to tobacco smoke. She has never used smokeless tobacco. No history on file for alcohol use and drug use.    Family History  Family History   Problem Relation Name Age of Onset    Diabetes Sister      Breast cancer Other grandmother         Allergies  Adhesive, Cefaclor, Famotidine,  "Hydromorphone, Metronidazole, and Sulfamethoxazole-trimethoprim      A comprehensive 10+ review of systems was negative except for: see hpi                          PHYSICAL EXAMINATION:  BP Readings from Last 3 Encounters:   12/08/23 113/80   11/07/23 111/88   07/21/23 (!) 143/94      Wt Readings from Last 3 Encounters:   12/08/23 64.4 kg (142 lb)   11/07/23 62.6 kg (138 lb)   07/21/23 64.7 kg (142 lb 9.6 oz)      BMI: Estimated body mass index is 25.15 kg/m² as calculated from the following:    Height as of 7/21/23: 1.6 m (5' 3\").    Weight as of 12/8/23: 64.4 kg (142 lb).  BSA: Estimated body surface area is 1.69 meters squared as calculated from the following:    Height as of 7/21/23: 1.6 m (5' 3\").    Weight as of 12/8/23: 64.4 kg (142 lb).  HEENT: Normocephalic, atraumatic, PER EOMI, nonicteric, trachea normal, thyroid normal, oropharynx normal.  CARDIAC: regular rate & rhythm, S1 & S2 normal.  No heaves, thrills, gallops or murmurs.  LUNGS: Clear to auscultation, no spinal or CV tenderness.  EXTREMITIES: No evidence of cyanosis, clubbing or edema.          Assessment:      68-year-old with fecal incontinence occurring status post subtotal colectomy with ileorectal anastomosis in 2007 for chronic constipation, recurrent UTI, urinary urge incontinence     FI:  moderate improvement with colestipol 1 mg bid and metamucil  increase to 2 mg bid, on loperamide and lomotil  Negligible improvement with SNM  s/p battery change to non-rechargeable, working ok, she bumped up stimulation today     Continue Colestipol 2 mg bid   Fitted with an Eclipse pessary  Follow up in 2-3 weeks     Shaniqua:  contnue daily trimethoprim   continue vaginal estrogen  no d-mannose, as may worsen FI, could consider methenamine if needed      UUI:  no improvement with oxybutynin  Failed gemtesa  Improved significantly with Fareye device       Osmel Santillan MD  "

## 2023-12-29 ENCOUNTER — TELEPHONE (OUTPATIENT)
Dept: UROLOGY | Facility: CLINIC | Age: 68
End: 2023-12-29
Payer: MEDICARE

## 2023-12-29 DIAGNOSIS — N95.8 GENITOURINARY SYNDROME OF MENOPAUSE: ICD-10-CM

## 2023-12-29 DIAGNOSIS — R30.0 DYSURIA: Primary | ICD-10-CM

## 2023-12-29 RX ORDER — PHENAZOPYRIDINE HYDROCHLORIDE 200 MG/1
200 TABLET, FILM COATED ORAL 3 TIMES DAILY
Qty: 10 TABLET | Refills: 0 | Status: SHIPPED | OUTPATIENT
Start: 2023-12-29 | End: 2024-01-08

## 2024-01-02 ENCOUNTER — TELEPHONE (OUTPATIENT)
Dept: UROLOGY | Facility: CLINIC | Age: 69
End: 2024-01-02

## 2024-01-02 ENCOUNTER — APPOINTMENT (OUTPATIENT)
Dept: CARDIOLOGY | Facility: CLINIC | Age: 69
End: 2024-01-02
Payer: MEDICARE

## 2024-01-02 NOTE — TELEPHONE ENCOUNTER
Blanka Schafer needs a clarification on pyridium prescription. SIG says to take 1 tablet 3 times a day beltran 10 days but the quantity is only 10 pills. Please clarify. Thank you.

## 2024-01-09 ENCOUNTER — OFFICE VISIT (OUTPATIENT)
Dept: RHEUMATOLOGY | Facility: CLINIC | Age: 69
End: 2024-01-09
Payer: MEDICARE

## 2024-01-09 ENCOUNTER — LAB (OUTPATIENT)
Dept: LAB | Facility: LAB | Age: 69
End: 2024-01-09
Payer: MEDICARE

## 2024-01-09 VITALS
TEMPERATURE: 98.4 F | HEART RATE: 94 BPM | WEIGHT: 138 LBS | BODY MASS INDEX: 24.45 KG/M2 | DIASTOLIC BLOOD PRESSURE: 67 MMHG | SYSTOLIC BLOOD PRESSURE: 98 MMHG | HEIGHT: 63 IN

## 2024-01-09 DIAGNOSIS — M35.3 PMR (POLYMYALGIA RHEUMATICA) (MULTI): Primary | ICD-10-CM

## 2024-01-09 DIAGNOSIS — M35.3 PMR (POLYMYALGIA RHEUMATICA) (MULTI): ICD-10-CM

## 2024-01-09 PROCEDURE — 36415 COLL VENOUS BLD VENIPUNCTURE: CPT

## 2024-01-09 PROCEDURE — 86431 RHEUMATOID FACTOR QUANT: CPT

## 2024-01-09 PROCEDURE — 86225 DNA ANTIBODY NATIVE: CPT

## 2024-01-09 PROCEDURE — 99214 OFFICE O/P EST MOD 30 MIN: CPT | Performed by: INTERNAL MEDICINE

## 2024-01-09 PROCEDURE — 1160F RVW MEDS BY RX/DR IN RCRD: CPT | Performed by: INTERNAL MEDICINE

## 2024-01-09 PROCEDURE — 86235 NUCLEAR ANTIGEN ANTIBODY: CPT

## 2024-01-09 PROCEDURE — 1159F MED LIST DOCD IN RCRD: CPT | Performed by: INTERNAL MEDICINE

## 2024-01-09 PROCEDURE — 86038 ANTINUCLEAR ANTIBODIES: CPT

## 2024-01-09 PROCEDURE — 1125F AMNT PAIN NOTED PAIN PRSNT: CPT | Performed by: INTERNAL MEDICINE

## 2024-01-09 RX ORDER — TRIAMCINOLONE ACETONIDE 40 MG/ML
40 INJECTION, SUSPENSION INTRA-ARTICULAR; INTRAMUSCULAR ONCE
Status: COMPLETED | OUTPATIENT
Start: 2024-01-09 | End: 2024-05-28

## 2024-01-09 ASSESSMENT — PAIN SCALES - GENERAL: PAINLEVEL: 6

## 2024-01-09 NOTE — PROGRESS NOTES
Informants: Patient and EMR.  PP: 68 year-old female with history of gastroesophageal reflux disorder, osteoporosis, reactive arthritis, status post colectomy for hypomotility, polymyalgia rheumatica.  CC: Generalized musculoskeletal pain involving the knees, elbows, neck, thoracic region, and lumbar region of the spine.  [place]: She initially had developed pain in her right wrist and left ankle in 2013. At that time she was found to have staph aureus bacteremia and septic arthritis of the right wrist requiring surgical debridement of the right wrist. Since that time she has been having pain involving multiple joints with periodic exacerbations. She currently notes pain in her neck and in the MCP joint of the second digit of the left hand.  She is planning to start physical therapy for the neck pain. She underwent colectomy around 2005 secondary to hypomotility of the colon. She has a history of intestinal fistulas and vaginal fistulas. She has been treated with antibiotics for small bowel bacterial overgrowth. For 1 year she had recurrent blisters initially involving the left supraclavicular region, mouth, and then spreading to involve the lower extremities and more recently involving her hands and forearms. She was evaluated by dermatology and had skin biopsy without etiology of the rash. She had intermittent swelling of her hands. She has morning stiffness. She had blurred vision due to cataracts and has 1 eye is farsighted and the other eye is nearsighted. She has a history of osteoporosis. She has fatigue despite taking stimulants for treatment of sleep disorder. She has history of positive SERENA with negative CHAD panel and positive HLA-B27.She was treated recently with monthly intramuscular injections of triamcinolone for treatment of polymyalgia rheumatica.  The triamcinolone injections were discontinued because of weight gain.  She received intravenous infusion of zoledronic acid 9/29/2023 and 11/4/2021 for  treatment of osteoporosis.  PH: Allergies: Bactrim, cefaclor, Dilaudid, Flagyl, pantoprazole, intravenous Pepcid (hallucinations); illnesses: Gastroesophageal reflux disorder, bacterial overgrowth, osteoporosis (never received the Reclast infusion due to cancellation with Covid virus pandemic at the time), angioedema, fibromyalgia, staph aureus septic arthritis of the right wrist () with staph aureus bacteremia with septicemia, positive SERENA with negative CHAD panel, positive HLA-B27, hyperlipidemia, palpitations, bilateral cataracts; surgeries: Cholectomy ( secondary to slow transit), right elbow surgery for tennis elbow, right hand surgery for septic arthritis, lumbar nerve block, hernia repair.  SH: Tobacco: He smokes half pack of cigarettes per week but formerly smoked 1 pack/day. She drinks alcohol occasionally. She denies illicit drug use.  FH: Her father  at 79 years of age and had heart disease and C. difficile colitis. Her mother is alive at 88 years of age and has C1/C2 fracture secondary to fall, rheumatoid arthritis and multiple other arthritis is. She has a half brother with ulcerative colitis. She has a sister with arthritis. She has another sister who  of with motor vehicle accident and had diabetes mellitus. She has a half sister who  with drug overdose. She has a son who is healthy. She has a daughter with systemic lupus erythematosus. She has no other daughter with gluten sensitivity. She has a grandmother who had breast cancer.  PX: She is a well-developed, well-nourished, white female. The lungs, heart, abdomen, and extremities are benign. The musculoskeletal examination shows well-healed surgical scar with extensor aspect of the right hand.  There is tenderness over the left second MCP joint with preserved range of motion.. There are ovoid hyperpigmented macules scattered over the lower extremities.  DEXA bone density (2021) L1-L4 T-score is 0.0, left femoral neck T-score  -1.8, left total femur T-score -1.6, right femoral neck T-score -2.1, right total femur T-score -2.1.  2D echocardiogram (1/10/2023) left ventricular ejection fraction 60%, trace mitral and tricuspid regurgitation, physiologic pulmonic regurgitation.  There is no pericardial effusion.  X-ray hands (11/13/2019) normal.  Laboratory (5/20/2020) BUN 22, creatinine 0.82, glucose 95, ALT 19, AST 16, alkaline phosphatase 81, TPMT 27.9, WBC 8.3, hemoglobin 12.8, hematocrit 39.7, , MCHC 32.2, platelets 291, (11/13/2019) HLA-B27 positive, SERENA 1: 160, CHAD panel negative, CCP <1, RF <10, uric acid 3.3, C4 50, C3 135.  Impression: 68 year-old white female with history of reactive arthritis secondary to staph aureus septicemia and septic arthritis of the right hand was recently noted to have recurrent rashes on her forearms and lower extremities with a history of positive SERENA with negative CHAD panel and positive HLA-B27 with family history of mother having rheumatoid arthritis and a daughter with systemic lupus erythematosus. She has history of total colectomy with chronic diarrhea with small bowel bacterial overgrowth phenomena.  Plan: She is to have laboratory for ESR, CRP, rheumatoid factor, and SERENA panel.  She was given intramuscular injection of triamcinolone 40 mg.  Continue with plans for repeat zoledronic acid infusion after 9/29/2024.  She is to have repeat bone mineral density study after the zoledronic acid infusion.

## 2024-01-10 LAB
ANA PATTERN: ABNORMAL
ANA SER QL HEP2 SUBST: POSITIVE
ANA TITR SER IF: ABNORMAL {TITER}
CENTROMERE B AB SER-ACNC: <0.2 AI
CHROMATIN AB SERPL-ACNC: 0.2 AI
DSDNA AB SER-ACNC: 1 IU/ML
ENA JO1 AB SER QL IA: <0.2 AI
ENA RNP AB SER IA-ACNC: <0.2 AI
ENA SCL70 AB SER QL IA: <0.2 AI
ENA SM AB SER IA-ACNC: <0.2 AI
ENA SM+RNP AB SER QL IA: <0.2 AI
ENA SS-A AB SER IA-ACNC: <0.2 AI
ENA SS-B AB SER IA-ACNC: <0.2 AI
RHEUMATOID FACT SER NEPH-ACNC: <10 IU/ML (ref 0–15)
RIBOSOMAL P AB SER-ACNC: <0.2 AI

## 2024-01-19 ENCOUNTER — TELEMEDICINE (OUTPATIENT)
Dept: UROLOGY | Facility: CLINIC | Age: 69
End: 2024-01-19
Payer: MEDICARE

## 2024-01-19 DIAGNOSIS — N32.81 OAB (OVERACTIVE BLADDER): Primary | ICD-10-CM

## 2024-01-24 ENCOUNTER — APPOINTMENT (OUTPATIENT)
Dept: CARDIOLOGY | Facility: CLINIC | Age: 69
End: 2024-01-24
Payer: MEDICARE

## 2024-02-05 DIAGNOSIS — R15.9 INCONTINENCE OF FECES, UNSPECIFIED FECAL INCONTINENCE TYPE: Primary | ICD-10-CM

## 2024-02-05 DIAGNOSIS — K21.9 GASTROESOPHAGEAL REFLUX DISEASE WITHOUT ESOPHAGITIS: ICD-10-CM

## 2024-02-05 DIAGNOSIS — N32.81 OAB (OVERACTIVE BLADDER): Primary | ICD-10-CM

## 2024-02-05 DIAGNOSIS — K59.1 FUNCTIONAL DIARRHEA: ICD-10-CM

## 2024-02-05 DIAGNOSIS — Z86.79 HISTORY OF PSVT (PAROXYSMAL SUPRAVENTRICULAR TACHYCARDIA): ICD-10-CM

## 2024-02-05 RX ORDER — DIPHENOXYLATE HYDROCHLORIDE AND ATROPINE SULFATE 2.5; .025 MG/1; MG/1
1 TABLET ORAL 4 TIMES DAILY PRN
Qty: 120 TABLET | Refills: 5 | Status: SHIPPED | OUTPATIENT
Start: 2024-02-05 | End: 2025-02-04

## 2024-02-05 RX ORDER — LOPERAMIDE HYDROCHLORIDE 2 MG/1
CAPSULE ORAL
Qty: 80 CAPSULE | Refills: 0 | Status: SHIPPED | OUTPATIENT
Start: 2024-02-05 | End: 2024-04-15

## 2024-02-09 DIAGNOSIS — K21.9 GASTROESOPHAGEAL REFLUX DISEASE WITHOUT ESOPHAGITIS: ICD-10-CM

## 2024-02-09 DIAGNOSIS — K52.9 CHRONIC DIARRHEA: Primary | ICD-10-CM

## 2024-02-09 RX ORDER — PANTOPRAZOLE SODIUM 40 MG/1
40 TABLET, DELAYED RELEASE ORAL
Qty: 30 TABLET | Refills: 5 | Status: SHIPPED | OUTPATIENT
Start: 2024-02-09

## 2024-02-09 RX ORDER — MONTELUKAST SODIUM 4 MG/1
2 TABLET, CHEWABLE ORAL 2 TIMES DAILY
Qty: 120 TABLET | Refills: 0 | Status: SHIPPED | OUTPATIENT
Start: 2024-02-09

## 2024-02-09 RX ORDER — MONTELUKAST SODIUM 4 MG/1
1 TABLET, CHEWABLE ORAL
Qty: 180 TABLET | Refills: 6 | Status: SHIPPED | OUTPATIENT
Start: 2024-02-09 | End: 2024-05-31 | Stop reason: SDUPTHER

## 2024-02-09 RX ORDER — PANTOPRAZOLE SODIUM 40 MG/1
40 TABLET, DELAYED RELEASE ORAL DAILY
Qty: 30 TABLET | Refills: 0 | Status: SHIPPED | OUTPATIENT
Start: 2024-02-09

## 2024-02-12 RX ORDER — PROPRANOLOL HYDROCHLORIDE 10 MG/1
10 TABLET ORAL DAILY
Qty: 90 TABLET | Refills: 0 | OUTPATIENT
Start: 2024-02-12

## 2024-02-19 DIAGNOSIS — Z86.79 HISTORY OF PSVT (PAROXYSMAL SUPRAVENTRICULAR TACHYCARDIA): ICD-10-CM

## 2024-02-20 ENCOUNTER — OFFICE VISIT (OUTPATIENT)
Dept: UROLOGY | Facility: CLINIC | Age: 69
End: 2024-02-20
Payer: MEDICARE

## 2024-02-20 DIAGNOSIS — N32.81 OAB (OVERACTIVE BLADDER): Primary | ICD-10-CM

## 2024-02-20 DIAGNOSIS — R15.9 INCONTINENCE OF FECES, UNSPECIFIED FECAL INCONTINENCE TYPE: ICD-10-CM

## 2024-02-20 DIAGNOSIS — N39.0 RECURRENT UTI: ICD-10-CM

## 2024-02-20 PROCEDURE — 1125F AMNT PAIN NOTED PAIN PRSNT: CPT | Performed by: STUDENT IN AN ORGANIZED HEALTH CARE EDUCATION/TRAINING PROGRAM

## 2024-02-20 PROCEDURE — 1159F MED LIST DOCD IN RCRD: CPT | Performed by: STUDENT IN AN ORGANIZED HEALTH CARE EDUCATION/TRAINING PROGRAM

## 2024-02-20 PROCEDURE — 99214 OFFICE O/P EST MOD 30 MIN: CPT | Performed by: STUDENT IN AN ORGANIZED HEALTH CARE EDUCATION/TRAINING PROGRAM

## 2024-02-20 PROCEDURE — 1160F RVW MEDS BY RX/DR IN RCRD: CPT | Performed by: STUDENT IN AN ORGANIZED HEALTH CARE EDUCATION/TRAINING PROGRAM

## 2024-02-20 RX ORDER — PROPRANOLOL HYDROCHLORIDE 10 MG/1
10 TABLET ORAL DAILY
Qty: 30 TABLET | Refills: 0 | Status: SHIPPED | OUTPATIENT
Start: 2024-02-20 | End: 2024-06-04

## 2024-03-01 ENCOUNTER — APPOINTMENT (OUTPATIENT)
Dept: UROLOGY | Facility: CLINIC | Age: 69
End: 2024-03-01
Payer: MEDICARE

## 2024-03-14 PROBLEM — M85.80 OSTEOPENIA: Status: ACTIVE | Noted: 2024-03-14

## 2024-03-14 PROBLEM — G47.00 INSOMNIA: Status: ACTIVE | Noted: 2023-04-04

## 2024-03-14 PROBLEM — F41.9 ANXIETY DISORDER: Status: ACTIVE | Noted: 2023-04-04

## 2024-03-14 PROBLEM — K92.9 DISORDER OF DIGESTIVE TRACT: Status: ACTIVE | Noted: 2023-04-04

## 2024-03-14 PROBLEM — R92.8 ABNORMAL MAMMOGRAM: Status: RESOLVED | Noted: 2023-02-07 | Resolved: 2024-03-14

## 2024-03-14 PROBLEM — M65.949 FLEXOR TENOSYNOVITIS OF FINGER: Status: ACTIVE | Noted: 2017-04-14

## 2024-03-14 PROBLEM — B36.9 CUTANEOUS FUNGAL INFECTION: Status: ACTIVE | Noted: 2024-03-14

## 2024-03-14 PROBLEM — M02.30 REACTIVE ARTHRITIS (MULTI): Status: ACTIVE | Noted: 2018-04-26

## 2024-03-14 PROBLEM — E73.9 NONPERSISTENCE OF INTESTINAL LACTASE: Status: ACTIVE | Noted: 2024-03-14

## 2024-03-14 PROBLEM — G47.10 HYPERSOMNIA: Status: ACTIVE | Noted: 2024-03-14

## 2024-03-14 PROBLEM — F32.A DEPRESSIVE DISORDER: Status: ACTIVE | Noted: 2024-03-14

## 2024-03-14 PROBLEM — R07.9 CHEST PAIN: Status: ACTIVE | Noted: 2023-01-10

## 2024-03-14 PROBLEM — M70.60 GREATER TROCHANTERIC BURSITIS: Status: ACTIVE | Noted: 2024-03-14

## 2024-03-14 PROBLEM — G25.81 RESTLESS LEGS SYNDROME: Status: ACTIVE | Noted: 2023-04-04

## 2024-03-14 PROBLEM — R12 HEARTBURN: Status: ACTIVE | Noted: 2023-04-04

## 2024-03-14 PROBLEM — Z86.39 HISTORY OF THYROID DISORDER: Status: ACTIVE | Noted: 2024-03-14

## 2024-03-14 PROBLEM — G47.14 HYPERSOMNIA DUE TO MEDICAL CONDITION: Status: ACTIVE | Noted: 2020-11-02

## 2024-03-14 PROBLEM — R19.8 ALTERED BOWEL FUNCTION: Status: ACTIVE | Noted: 2024-03-14

## 2024-03-14 PROBLEM — Z20.822 CONTACT WITH AND (SUSPECTED) EXPOSURE TO COVID-19: Status: ACTIVE | Noted: 2022-07-20

## 2024-03-14 PROBLEM — N39.0 ACUTE URINARY TRACT INFECTION: Status: ACTIVE | Noted: 2023-02-07

## 2024-03-14 PROBLEM — S22.39XA FRACTURE OF RIB: Status: ACTIVE | Noted: 2024-03-14

## 2024-03-14 PROBLEM — F17.200 NICOTINE DEPENDENCE: Status: ACTIVE | Noted: 2023-04-04

## 2024-03-14 PROBLEM — F90.9 ATTENTION DEFICIT HYPERACTIVITY DISORDER (ADHD): Status: ACTIVE | Noted: 2023-04-04

## 2024-03-14 PROBLEM — R76.8 POSITIVE ANTINUCLEAR ANTIBODY: Status: ACTIVE | Noted: 2022-06-23

## 2024-03-14 PROBLEM — A04.9: Status: ACTIVE | Noted: 2023-02-07

## 2024-03-14 PROBLEM — M65.9 FLEXOR TENOSYNOVITIS OF FINGER: Status: ACTIVE | Noted: 2017-04-14

## 2024-03-21 ENCOUNTER — APPOINTMENT (OUTPATIENT)
Dept: PRIMARY CARE | Facility: CLINIC | Age: 69
End: 2024-03-21
Payer: MEDICARE

## 2024-03-22 ENCOUNTER — PATIENT MESSAGE (OUTPATIENT)
Dept: INFUSION THERAPY | Facility: CLINIC | Age: 69
End: 2024-03-22
Payer: MEDICARE

## 2024-04-05 ENCOUNTER — HOSPITAL ENCOUNTER (OUTPATIENT)
Dept: RADIOLOGY | Facility: CLINIC | Age: 69
Discharge: HOME | End: 2024-04-05
Payer: MEDICARE

## 2024-04-05 ENCOUNTER — OFFICE VISIT (OUTPATIENT)
Dept: ORTHOPEDIC SURGERY | Facility: CLINIC | Age: 69
End: 2024-04-05
Payer: MEDICARE

## 2024-04-05 DIAGNOSIS — M25.579 ANKLE PAIN, UNSPECIFIED CHRONICITY, UNSPECIFIED LATERALITY: ICD-10-CM

## 2024-04-05 DIAGNOSIS — S82.841A BIMALLEOLAR ANKLE FRACTURE, RIGHT, CLOSED, INITIAL ENCOUNTER: Primary | ICD-10-CM

## 2024-04-05 PROCEDURE — 1159F MED LIST DOCD IN RCRD: CPT | Performed by: ORTHOPAEDIC SURGERY

## 2024-04-05 PROCEDURE — 27808 TREATMENT OF ANKLE FRACTURE: CPT | Performed by: ORTHOPAEDIC SURGERY

## 2024-04-05 PROCEDURE — 73610 X-RAY EXAM OF ANKLE: CPT | Mod: RIGHT SIDE | Performed by: RADIOLOGY

## 2024-04-05 PROCEDURE — 73610 X-RAY EXAM OF ANKLE: CPT | Mod: RT

## 2024-04-05 PROCEDURE — 1160F RVW MEDS BY RX/DR IN RCRD: CPT | Performed by: ORTHOPAEDIC SURGERY

## 2024-04-05 PROCEDURE — 99213 OFFICE O/P EST LOW 20 MIN: CPT | Mod: 57,25 | Performed by: ORTHOPAEDIC SURGERY

## 2024-04-05 PROCEDURE — L3260 AMBULATORY SURGICAL BOOT EAC: HCPCS | Performed by: ORTHOPAEDIC SURGERY

## 2024-04-05 PROCEDURE — 99203 OFFICE O/P NEW LOW 30 MIN: CPT | Performed by: ORTHOPAEDIC SURGERY

## 2024-04-05 RX ORDER — ACETAMINOPHEN AND CODEINE PHOSPHATE 300; 30 MG/1; MG/1
1 TABLET ORAL EVERY 6 HOURS PRN
Qty: 30 TABLET | Refills: 0 | Status: CANCELLED | OUTPATIENT
Start: 2024-04-05 | End: 2024-04-12

## 2024-04-05 RX ORDER — NALOXONE HYDROCHLORIDE 4 MG/.1ML
4 SPRAY NASAL AS NEEDED
Qty: 2 EACH | Refills: 0 | Status: SHIPPED | OUTPATIENT
Start: 2024-04-05

## 2024-04-05 NOTE — PROGRESS NOTES
This 68-year-old woman was seen with her sister for right ankle injury sustained on March 24, 2024 when she fell off a curb.  The patient noted the acute onset of pain swelling and ecchymosis in her ankle.  She went to an urgent care center where she was placed in a removable boot and referred for definitive treatment.    The patient notes she is having pain both medially and laterally about the ankle.  Patient notes the removable boot is not very comfortable.  The patient denies any previous injuries to her right ankle or any neurologic symptoms in the lower extremity.    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    The patient smokes cigarettes.    Review of systems:  A complete review of the patient's past medical history and review of 30 systems and all medications and allergies was performed. Please see adult medical record for details.    A Family history for genetic or familial inheritance issues and Social history including smoking history, alcohol consumption and exercise activities was also reviewed and significant findings noted in the patients history of present illness.    Physical Exam:  The patient is well-nourished and well-developed and in no acute distress. The patient displayed normal mood and affect. The patient's pupils were equal, round sclerae are white. Patient's respirations appear to be regular and unlabored.     Examination right ankle was performed.  There was swelling, ecchymosis and bruising over the medial and lateral malleolus.  The skin and neurovascular examination of the foot was intact. The neurological  exam including motor and sensory exam was performed. The vascular examination including palpation of pulses and capillary refill of the foot was performed and determined to be intact.   There was tenderness to palpation over the medial and lateral malleolus.  There was no significant tenderness over the anterior or posterior ankle.  There was no gross instability of the foot or ankle.  There was no lymphangitis.  Subtalar and midtarsal motion was intact.  Menjivar test was negative for Achilles tendon rupture.  Calf was soft, nontender and there were no palpable cords.    Imaging:  I personally reviewed and measured the radiographs including AP and lateral views of the extremity and they revealed nondisplaced medial malleolus fracture with intra-articular extension and a nondisplaced lateral malleolus fracture.  Mortise is well-preserved.    Impression & Plan:   It is my impression this patient has sustained a nondisplaced bimalleolar fracture of her right ankle.  Alignment is acceptable.  The mortise is intact.    Patient placed in a fiberglass short leg walking cast.  She should only weight-bear for necessity.    The patient was advised to avoid nonsteroidal anti-inflammatory medications and the use of any nicotinic products including smoking cigarettes.  Avoidance of these drugs will decrease the risk of healing complications.  We discussed proper nutrition including calcium and protein.  I advised the patient to take a daily multiple vitamin.    I have specifically explained to the patient who was planning to fly on a trip within a month that I would strongly recommend against flying in an airplane because of the risk of fatal pulmonary embolism.    I would like to see the patient back in 1 month with an x-ray AP lateral mortise view of her ankle prior to being seen.      Note dictated with "[facility]" software.  Completed without full type editing and sent to avoid delay.    Add addendum:  Patient notes Tylenol is not helping her with her pain and she does not have anything else to take.  I prescribed Tylenol 3 30 tablets 1 p.o. every 4-6 hours as needed pain.  The patient understands this is an addicting medication and she should take it only when she is having severe pain.      Note dictated with [employer] software.  Completed without full type editing and sent to avoid delay.

## 2024-04-05 NOTE — LETTER
April 5, 2024     Ileana Mclaughlin DO  [address]  [facility] [address]    Patient: Sabina Chopra   YOB: 1955   Date of Visit: 4/5/2024       Dear Dr. Ileana Mclaughlin DO:    Thank you for referring Sabina Chopra to me for evaluation. Below are my notes for this consultation.  If you have questions, please do not hesitate to call me. I look forward to following your patient along with you.       Sincerely,     Nestor Villegas MD      CC: No Recipients  ______________________________________________________________________________________    This 68-year-old woman was seen with her sister for right ankle injury sustained on March 24, 2024 when she fell off a curb.  The patient noted the acute onset of pain swelling and ecchymosis in her ankle.  She went to an urgent care center where she was placed in a removable boot and referred for definitive treatment.    The patient notes she is having pain both medially and laterally about the ankle.  Patient notes the removable boot is not very comfortable.  The patient denies any previous injuries to her right ankle or any neurologic symptoms in the lower extremity.    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    The patient smokes cigarettes.    Review of systems:  A complete review of the patient's past medical history and review of 30 systems and all medications and allergies was performed. Please see adult medical record for details.    A Family history for genetic or familial inheritance issues and Social history including smoking history,  alcohol consumption and exercise activities was also reviewed and significant findings noted in the patients history of present illness.    Physical Exam:  The patient is well-nourished and well-developed and in no acute distress. The patient displayed normal mood and affect. The patient's pupils were equal, round sclerae are white. Patient's respirations appear to be regular and unlabored.     Examination right ankle was performed.  There was swelling, ecchymosis and bruising over the medial and lateral malleolus.  The skin and neurovascular examination of the foot was intact. The neurological exam including motor and sensory exam was performed. The vascular examination including palpation of pulses and capillary refill of the foot was performed and determined to be intact.   There was tenderness to palpation over the medial and lateral malleolus.  There was no significant tenderness over the anterior or posterior ankle.  There was no gross instability of the foot or ankle.  There was no lymphangitis.  Subtalar and midtarsal motion was intact.  Menjivar test was negative for Achilles tendon rupture.  Calf was soft, nontender and there were no palpable cords.    Imaging:  I personally reviewed and measured the radiographs including AP and lateral views of the extremity and they revealed nondisplaced medial malleolus fracture with intra-articular extension and a nondisplaced lateral malleolus fracture.  Mortise is well-preserved.    Impression & Plan:   It is my impression this patient has sustained a nondisplaced bimalleolar fracture of her right ankle.  Alignment is acceptable.  The mortise is intact.    Patient placed in a fiberglass short leg walking cast.  She should only weight-bear for necessity.    The patient was advised to avoid nonsteroidal anti-inflammatory medications and the use of any nicotinic products including smoking cigarettes.  Avoidance of these drugs will decrease the risk of healing  complications.  We discussed proper nutrition including calcium and protein.  I advised the patient to take a daily multiple vitamin.    I have specifically explained to the patient who was planning to fly on a trip within a month that I would strongly recommend against flying in an airplane because of the risk of fatal pulmonary embolism.    I would like to see the patient back in 1 month with an x-ray AP lateral mortise view of her ankle prior to being seen.      Note dictated with [employer] software.  Completed without full type editing and sent to avoid delay.

## 2024-04-12 DIAGNOSIS — N32.81 OAB (OVERACTIVE BLADDER): ICD-10-CM

## 2024-04-15 ENCOUNTER — APPOINTMENT (OUTPATIENT)
Dept: PRIMARY CARE | Facility: CLINIC | Age: 69
End: 2024-04-15
Payer: MEDICARE

## 2024-04-15 DIAGNOSIS — R15.9 INCONTINENCE OF FECES, UNSPECIFIED FECAL INCONTINENCE TYPE: Primary | ICD-10-CM

## 2024-04-15 RX ORDER — DIPHENOXYLATE HYDROCHLORIDE AND ATROPINE SULFATE 2.5; .025 MG/1; MG/1
1 TABLET ORAL 4 TIMES DAILY PRN
Qty: 480 TABLET | Refills: 0 | Status: SHIPPED | OUTPATIENT
Start: 2024-04-15 | End: 2024-08-13

## 2024-04-15 RX ORDER — LOPERAMIDE HYDROCHLORIDE 2 MG/1
CAPSULE ORAL
Qty: 80 CAPSULE | Refills: 0 | Status: SHIPPED | OUTPATIENT
Start: 2024-04-15 | End: 2024-05-28

## 2024-05-02 ENCOUNTER — HOSPITAL ENCOUNTER (OUTPATIENT)
Dept: RADIOLOGY | Facility: CLINIC | Age: 69
Discharge: HOME | End: 2024-05-02
Payer: MEDICARE

## 2024-05-02 ENCOUNTER — OFFICE VISIT (OUTPATIENT)
Dept: ORTHOPEDIC SURGERY | Facility: CLINIC | Age: 69
End: 2024-05-02
Payer: MEDICARE

## 2024-05-02 DIAGNOSIS — S82.841A BIMALLEOLAR ANKLE FRACTURE, RIGHT, CLOSED, INITIAL ENCOUNTER: ICD-10-CM

## 2024-05-02 DIAGNOSIS — M25.579 ANKLE PAIN, UNSPECIFIED CHRONICITY, UNSPECIFIED LATERALITY: ICD-10-CM

## 2024-05-02 PROCEDURE — 29425 APPL SHORT LEG CAST WALKING: CPT | Performed by: ORTHOPAEDIC SURGERY

## 2024-05-02 PROCEDURE — 1160F RVW MEDS BY RX/DR IN RCRD: CPT | Performed by: ORTHOPAEDIC SURGERY

## 2024-05-02 PROCEDURE — 1159F MED LIST DOCD IN RCRD: CPT | Performed by: ORTHOPAEDIC SURGERY

## 2024-05-02 PROCEDURE — 73610 X-RAY EXAM OF ANKLE: CPT | Mod: RT

## 2024-05-02 PROCEDURE — 99024 POSTOP FOLLOW-UP VISIT: CPT | Performed by: ORTHOPAEDIC SURGERY

## 2024-05-02 PROCEDURE — 73610 X-RAY EXAM OF ANKLE: CPT | Mod: RIGHT SIDE | Performed by: RADIOLOGY

## 2024-05-02 NOTE — LETTER
May 2, 2024     Ileana Mclaughlin DO  [address]  [facility] [address]    Patient: Sabina Chopra   YOB: 1955   Date of Visit: 5/2/2024       Dear Dr. Ileana Mclaughlin DO:    Thank you for referring Sabina Chopra to me for evaluation. Below are my notes for this consultation.  If you have questions, please do not hesitate to call me. I look forward to following your patient along with you.       Sincerely,     Nestor Villegas MD      CC: No Recipients  ______________________________________________________________________________________    This 68-year-old woman returns today with her sister for 6-week follow-up on her bimalleolar ankle fracture.  The patient is without complaints at this time.  She claims that she is having minimal if any pain.    Physical Exam:  The patient is well-nourished and well-developed and in no acute distress. The patient displayed normal mood and affect.  Patient's respirations appear to be regular and unlabored.  There is mild tenderness at the fracture sites. There is mild swelling of the ankle.  The calf is soft and nontender and without swelling.  The neurovascular exam is intact.  Good alignment is noted.    Imaging:  I personally reviewed and measured the radiographs including AP and lateral views of the extremity and they revealed healing fracture in good alignment.    Impression & Plan:   It is my impression this patient's fractures are healing in good alignment.  They are not healed at this point.  The patient was placed in a short leg fiberglass walking cast.  He should only weight-bear for necessity.  We again discussed proper nutrition.  The patient is going on vacation but driving and not flying.  She should not get the cast wet.  I would like to see the patient back in 1 month with an x-ray AP lateral and mortise view out of her cast prior to being seen.      Note dictated with [employer] software.   Completed without full type editing and sent to avoid delay.

## 2024-05-02 NOTE — PROGRESS NOTES
This 68-year-old woman returns today with her sister for 6-week follow-up on her bimalleolar ankle fracture.  The patient is without complaints at this time.  She claims that she is having minimal if any pain.    Physical Exam:  The patient is well-nourished and well-developed and in no acute distress. The patient displayed normal mood and affect.  Patient's respirations appear to be regular and unlabored.  There is mild tenderness at the fracture sites. There is mild swelling of the ankle.  The calf is soft and nontender and without swelling.  The neurovascular exam is intact.  Good alignment is noted.    Imaging:  I personally reviewed and measured the radiographs including AP and lateral views of the extremity and they revealed healing fracture in good alignment.    Impression & Plan:   It is my impression this patient's fractures are healing in good alignment.  They are not healed at this point.  The patient was placed in a short leg fiberglass walking cast.  He should only weight-bear for necessity.  We again discussed proper nutrition.  The patient is going on vacation but driving and not flying.  She should not get the cast wet.  I would like to see the patient back in 1 month with an x-ray AP lateral and mortise view out of her cast prior to being seen.      Note dictated with Ovelin software.  Completed without full type editing and sent to avoid delay.

## 2024-05-27 DIAGNOSIS — Z86.79 HISTORY OF PSVT (PAROXYSMAL SUPRAVENTRICULAR TACHYCARDIA): ICD-10-CM

## 2024-05-27 DIAGNOSIS — K59.1 FUNCTIONAL DIARRHEA: ICD-10-CM

## 2024-05-27 DIAGNOSIS — N32.81 OAB (OVERACTIVE BLADDER): ICD-10-CM

## 2024-05-28 ENCOUNTER — OFFICE VISIT (OUTPATIENT)
Dept: RHEUMATOLOGY | Facility: CLINIC | Age: 69
End: 2024-05-28
Payer: MEDICARE

## 2024-05-28 ENCOUNTER — HOSPITAL ENCOUNTER (OUTPATIENT)
Dept: RADIOLOGY | Facility: CLINIC | Age: 69
Discharge: HOME | End: 2024-05-28
Payer: MEDICARE

## 2024-05-28 ENCOUNTER — LAB (OUTPATIENT)
Dept: LAB | Facility: LAB | Age: 69
End: 2024-05-28
Payer: MEDICARE

## 2024-05-28 VITALS
BODY MASS INDEX: 24.45 KG/M2 | SYSTOLIC BLOOD PRESSURE: 97 MMHG | HEIGHT: 63 IN | HEART RATE: 77 BPM | DIASTOLIC BLOOD PRESSURE: 65 MMHG | TEMPERATURE: 96.6 F

## 2024-05-28 DIAGNOSIS — M19.049 ARTHROPATHY OF HAND: ICD-10-CM

## 2024-05-28 DIAGNOSIS — M35.3 POLYMYALGIA RHEUMATICA (MULTI): ICD-10-CM

## 2024-05-28 DIAGNOSIS — E03.9 ACQUIRED HYPOTHYROIDISM: ICD-10-CM

## 2024-05-28 DIAGNOSIS — M16.0 PRIMARY OSTEOARTHRITIS OF BOTH HIPS: ICD-10-CM

## 2024-05-28 DIAGNOSIS — M81.0 AGE-RELATED OSTEOPOROSIS WITHOUT CURRENT PATHOLOGICAL FRACTURE: ICD-10-CM

## 2024-05-28 DIAGNOSIS — M81.0 AGE-RELATED OSTEOPOROSIS WITHOUT CURRENT PATHOLOGICAL FRACTURE: Primary | ICD-10-CM

## 2024-05-28 LAB
25(OH)D3 SERPL-MCNC: 21 NG/ML (ref 30–100)
CRP SERPL-MCNC: 0.87 MG/DL
TSH SERPL-ACNC: 1.84 MIU/L (ref 0.44–3.98)

## 2024-05-28 PROCEDURE — 2500000005 HC RX 250 GENERAL PHARMACY W/O HCPCS: Performed by: INTERNAL MEDICINE

## 2024-05-28 PROCEDURE — 1160F RVW MEDS BY RX/DR IN RCRD: CPT | Performed by: INTERNAL MEDICINE

## 2024-05-28 PROCEDURE — 99214 OFFICE O/P EST MOD 30 MIN: CPT | Performed by: INTERNAL MEDICINE

## 2024-05-28 PROCEDURE — 82306 VITAMIN D 25 HYDROXY: CPT

## 2024-05-28 PROCEDURE — 2500000004 HC RX 250 GENERAL PHARMACY W/ HCPCS (ALT 636 FOR OP/ED): Performed by: INTERNAL MEDICINE

## 2024-05-28 PROCEDURE — 86140 C-REACTIVE PROTEIN: CPT

## 2024-05-28 PROCEDURE — 20604 DRAIN/INJ JOINT/BURSA W/US: CPT | Performed by: INTERNAL MEDICINE

## 2024-05-28 PROCEDURE — 73523 X-RAY EXAM HIPS BI 5/> VIEWS: CPT | Mod: BILATERAL PROCEDURE | Performed by: RADIOLOGY

## 2024-05-28 PROCEDURE — 73523 X-RAY EXAM HIPS BI 5/> VIEWS: CPT

## 2024-05-28 PROCEDURE — 84443 ASSAY THYROID STIM HORMONE: CPT

## 2024-05-28 PROCEDURE — 1159F MED LIST DOCD IN RCRD: CPT | Performed by: INTERNAL MEDICINE

## 2024-05-28 RX ORDER — LOPERAMIDE HYDROCHLORIDE 2 MG/1
CAPSULE ORAL
Qty: 80 CAPSULE | Refills: 0 | Status: SHIPPED | OUTPATIENT
Start: 2024-05-28

## 2024-05-28 RX ORDER — TRIMETHOPRIM 100 MG/1
100 TABLET ORAL DAILY
Qty: 90 TABLET | Refills: 0 | Status: SHIPPED | OUTPATIENT
Start: 2024-05-28

## 2024-05-28 RX ORDER — LIDOCAINE HYDROCHLORIDE 10 MG/ML
0.5 INJECTION INFILTRATION; PERINEURAL
Status: COMPLETED | OUTPATIENT
Start: 2024-05-28 | End: 2024-05-28

## 2024-05-28 RX ORDER — DICLOFENAC SODIUM 10 MG/G
2 GEL TOPICAL 4 TIMES DAILY PRN
Qty: 2 G | Refills: 1 | Status: SHIPPED | OUTPATIENT
Start: 2024-05-28

## 2024-05-28 RX ADMIN — LIDOCAINE HYDROCHLORIDE 0.5 ML: 10 INJECTION, SOLUTION INFILTRATION; PERINEURAL at 16:24

## 2024-05-28 RX ADMIN — TRIAMCINOLONE ACETONIDE 20 MG: 40 INJECTION, SUSPENSION INTRA-ARTICULAR; INTRAMUSCULAR at 16:24

## 2024-05-28 NOTE — PROGRESS NOTES
Subjective   Patient ID: [id number]   Sabina Chopra is a 68 y.o. female who presents for Follow-up.  HPI  Informants: Patient and EMR.    PP: 68 year-old female with history of gastroesophageal reflux disorder, osteoporosis, reactive arthritis, status post colectomy for hypomotility, polymyalgia rheumatica, right bimalleolar fracture 3/24/2024 twisting her ankle when she fell stepping off a curb.    CC:  pain in the right 1st and 2nd MCP joints and right hip.  And had # right bilateral malleoli in March 2024  [place]:   RECALL    She initially had developed pain in her right wrist and left ankle in 2013. At that time she was found to have staph aureus bacteremia and septic arthritis of the right wrist requiring surgical debridement of the right wrist. Since that time she has been having pain involving multiple joints with periodic exacerbations. She currently notes pain in her neck and in the MCP joint of the second digit of the left hand.  She is planning to start physical therapy for the neck pain. She underwent colectomy around 2005 secondary to hypomotility of the colon. She has a history of intestinal fistulas and vaginal fistulas. She has been treated with antibiotics for small bowel bacterial overgrowth. For 1 year she had recurrent blisters initially involving the left supraclavicular region, mouth, and then spreading to involve the lower extremities and more recently involving her hands and forearms. She was evaluated by dermatology and had skin biopsy without etiology of the rash. She had intermittent swelling of her hands. She has morning stiffness. She had blurred vision due to cataracts and has 1 eye is farsighted and the other eye is nearsighted. She has a history of osteoporosis. She has fatigue despite taking stimulants for treatment of sleep disorder. She has history of positive SERENA with negative CHAD panel and positive HLA-B27.She was treated recently with monthly intramuscular injections of  triamcinolone for treatment of polymyalgia rheumatica.  The triamcinolone injections were discontinued because of weight gain.  She received intravenous infusion of zoledronic acid 2023 and 2021 for treatment of osteoporosis.  She also notes pain in her hips, right more than left.  She has history of having sustained 4 rib fractures following a fall while gardening 2 years ago.  PH: Allergies: Bactrim, cefaclor, Dilaudid, Flagyl, pantoprazole, intravenous Pepcid (hallucinations); illnesses: Gastroesophageal reflux disorder, bacterial overgrowth, osteoporosis (never received the Reclast infusion due to cancellation with Covid virus pandemic at the time), angioedema, fibromyalgia, staph aureus septic arthritis of the right wrist () with staph aureus bacteremia with septicemia, positive SERENA with negative CHAD panel, positive HLA-B27, hyperlipidemia, palpitations, bilateral cataracts; surgeries: Cholectomy ( secondary to slow transit), right elbow surgery for tennis elbow, right hand surgery for septic arthritis, lumbar nerve block, hernia repair.  Injury: Bimalleolar fracture right ankle (3/24/2024 when she fell stepping off of a curb).  She is currently wearing a cast and follow-up with orthopedics.  SH: Tobacco: He smokes half pack of cigarettes per week but formerly smoked 1 pack/day. She drinks alcohol occasionally. She denies illicit drug use.  FH: Her father  at 79 years of age and had heart disease and C. difficile colitis. Her mother is alive at 88 years of age and has C1/C2 fracture     secondary to fall, rheumatoid arthritis and multiple other arthritis is. She has a half brother with ulcerative colitis. She has a sister with arthritis. She has another sister who  of with motor vehicle accident and had diabetes mellitus. She has a half sister who  with drug overdose. She has a son who is healthy. She has a daughter with systemic lupus erythematosus. She has no other daughter with  gluten sensitivity. She has a grandmother who had breast cancer.  PX: She is a well-developed, well-nourished, white female. Cooperative, in NAD   Eyes: Conjunctiva clear, sclera white, anicteric   Nose: No external deformity, no mucosal crusting or signs of bleeding   Throat/Mouth: Moist mucous membranes, normal dentition, no ulcers or lesions   Lungs: No respiratory distress; lungs CTAB; no wheezes, rales, rhonchi, or stridor   Heart: Normal S1 and S2; regular rate and rhythm; no murmurs, rubs, or gallops  Skin: No rashes, ulcers, tophi, or nodules noted The lungs, heart, abdomen, and extremities are benign. The musculoskeletal examination shows well-healed surgical scar over the extensor aspect of the right hand.  There is tenderness over the left first and second MCP joints with preserved range of motion.  There are no joint effusions. Spine: Normal posture, no point tenderness to palpation, normal ROM  Upper Extremities:   Hands: synovial thickening of 1st and 2nd MCP of left hand with minimal tenderness   US showed grade 2 synovial proliferation  Spine: Normal posture, no point tenderness to palpation, normal ROM  Upper Extremities:       Wrists: No erythema, warmth, synovitis, or pain of the wrists; normal ROM  Elbows: No erythema, warmth, synovitis, or pain; normal ROM   Shoulders: No erythema, warmth, appreciable swelling, or pain; normal ROM   Lower Extremities:   Hips: No gross deformity, erythema, warmth, or pain; normal ROM   Knees: No erythema, warmth, swelling, or pain; normal ROM   Ankles: No erythema, warmth, synovitis, or pain; normal ROM  Feet: No gross deformity, erythema, or swelling; MTP squeeze negative bilaterally     Impression: 68 year-old white female with history of reactive arthritis secondary to staph aureus septicemia and septic arthritis of the right hand was recently noted to have recurrent rashes on her forearms and lower extremities with a history of positive SERENA with negative CHAD  panel and positive HLA-B27 with family history of mother having rheumatoid arthritis and a daughter with systemic lupus erythematosus. She has history of total colectomy with chronic diarrhea with small bowel bacterial overgrowth phenomena.  Plan: She is to have laboratory for ESR, CRP, rheumatoid factor, and SERENA panel.  She was given intramuscular injection of triamcinolone 40 mg.  Continue with plans for repeat zoledronic acid infusion after 9/29/2024.  She is to have repeat bone mineral density study after the zoledronic acid infusion.  DEXA bone density (9/21/2021) L1-L4 T-score is 0.0, left femoral neck T-score -1.8, left total femur T-score -1.6, right femoral neck T-score -2.1, right total femur T-score -2.1.  2D echocardiogram (1/10/2023) left ventricular ejection fraction 60%, trace mitral and tricuspid regurgitation, physiologic pulmonic regurgitation.  There is no pericardial effusion.  X-ray hands (11/13/2019) normal.  Laboratory (5/20/2020) BUN 22, creatinine 0.82, glucose 95, ALT 19, AST 16, alkaline phosphatase 81, TPMT 27.9, WBC 8.3, hemoglobin 12.8, hematocrit 39.7, , MCHC 32.2, platelets 291, (11/13/2019) HLA-B27 positive, SERENA 1: 160, CHAD panel negative, CCP <1, RF <10, uric acid 3.3, C4 50, C3 135.    Lab Results   Component Value Date    CRP 1.17 (A) 08/17/2023       Impression: 68 year-old white female with history of reactive arthritis secondary to staph aureus septicemia and septic arthritis of the right hand was recently noted to have recurrent rashes on her forearms and lower extremities with a history of positive SERENA with negative CHAD panel and positive HLA-B27 with family history of mother having rheumatoid arthritis and a daughter with systemic lupus erythematosus. She has history of total colectomy with chronic diarrhea with small bowel bacterial overgrowth phenomena.  She has history of multinodular thyroid for which she plans to see the general surgeon for further assessment.   There are no signs of recurrence polymyalgia rheumatica symptoms.  Plan: She is to have laboratory for ESR, CRP, 25-hydroxy vitamin D. Continue with plans for repeat zoledronic acid infusion after 9/29/2024.  She is to have radiographs of the hips and hands.  She was given a intra-articular injection of triamcinolone 10 mg with 0.5 mL of 1% lidocaine to the first and second MCP joint of the left hand.  She is to continue diclofenac 1% gel.  She is to continue with General surgery evaluation of the multinodular thyroid.  She was given intra-articular injection of triamcinolone 20 mg with 0.5 mL of 1% lidocaine to the left first and second MCP joints under ultrasound guidance by the rheumatology fellow.        Assessment/Plan   Diagnoses and all orders for this visit:  Age-related osteoporosis without current pathological fracture  On Reclast from 2021  Received 1st dose 2021, then next dose Aug 2023  3rd dose this year  Will then repeat DXA  and decide about further doses vs drug holiday  The ankle fracture was falling off a curb    Polymyalgia rheumatica (Multi)  Prior diagnosis  In remission clinically       C-Reactive Protein; Future  -     Vitamin D 25-Hydroxy,Total (for eval of Vitamin D levels); Future  Will check inflammatory markers  -     Acquired hypothyroidism  -     TSH with reflex to Free T4 if abnormal; Future  Primary osteoarthritis of both hips  Rt hip pain    XR hips bilateral 3 or 4 VW w pelvis when performed; Future  Will have Xray performed  -     diclofenac sodium (Voltaren) 1 % gel; Apply 2.25 inches (2 g) topically 4 times a day as needed (for hand pain).  Arthropathy of hand  Had US guided CSI in thePatient ID: Sabina Chopra is a 68 y.o. female.    Hand / Upper Extremity Injection/Arthrocentesis: L index MCP (Left 1st and 2nd MCP) for osteoarthritis on 5/28/2024 4:24 PM  Indications: pain  Details: 25 G needle, ultrasound-guided radial approach  Medications: 20 mg triamcinolone acetonide  (Kenalog-40) injection 40 mg/mL; 0.5 mL lidocaine 10 mg/mL (1 %)  Aspirate: 0.5 mL  Consent was given by the patient. Patient was prepped and draped in the usual sterile fashion.        1st and 2nd MCP of left hand  See procedure details      Follow up with Dr Pierre next available appointment.    Elvin Pollard MD

## 2024-05-31 DIAGNOSIS — K52.9 CHRONIC DIARRHEA: ICD-10-CM

## 2024-05-31 DIAGNOSIS — K59.1 FUNCTIONAL DIARRHEA: ICD-10-CM

## 2024-05-31 RX ORDER — MONTELUKAST SODIUM 4 MG/1
1 TABLET, CHEWABLE ORAL
Qty: 180 TABLET | Refills: 6 | Status: SHIPPED | OUTPATIENT
Start: 2024-05-31

## 2024-05-31 RX ORDER — MONTELUKAST SODIUM 4 MG/1
2 TABLET, CHEWABLE ORAL 2 TIMES DAILY
Qty: 120 TABLET | Refills: 0 | Status: SHIPPED | OUTPATIENT
Start: 2024-05-31

## 2024-06-04 RX ORDER — PROPRANOLOL HYDROCHLORIDE 10 MG/1
10 TABLET ORAL DAILY
Qty: 90 TABLET | Refills: 3 | Status: SHIPPED | OUTPATIENT
Start: 2024-06-04

## 2024-06-06 ENCOUNTER — HOSPITAL ENCOUNTER (OUTPATIENT)
Dept: RADIOLOGY | Facility: CLINIC | Age: 69
Discharge: HOME | End: 2024-06-06
Payer: MEDICARE

## 2024-06-06 ENCOUNTER — OFFICE VISIT (OUTPATIENT)
Dept: ORTHOPEDIC SURGERY | Facility: CLINIC | Age: 69
End: 2024-06-06
Payer: MEDICARE

## 2024-06-06 ENCOUNTER — OFFICE VISIT (OUTPATIENT)
Dept: CARDIOLOGY | Facility: CLINIC | Age: 69
End: 2024-06-06
Payer: MEDICARE

## 2024-06-06 VITALS
WEIGHT: 134 LBS | SYSTOLIC BLOOD PRESSURE: 140 MMHG | HEIGHT: 63 IN | DIASTOLIC BLOOD PRESSURE: 80 MMHG | HEART RATE: 66 BPM | BODY MASS INDEX: 23.74 KG/M2

## 2024-06-06 DIAGNOSIS — S82.841A BIMALLEOLAR ANKLE FRACTURE, RIGHT, CLOSED, INITIAL ENCOUNTER: ICD-10-CM

## 2024-06-06 DIAGNOSIS — Z86.79 HISTORY OF PSVT (PAROXYSMAL SUPRAVENTRICULAR TACHYCARDIA): Primary | ICD-10-CM

## 2024-06-06 DIAGNOSIS — F17.218 CIGARETTE NICOTINE DEPENDENCE WITH OTHER NICOTINE-INDUCED DISORDER: ICD-10-CM

## 2024-06-06 PROCEDURE — 1160F RVW MEDS BY RX/DR IN RCRD: CPT | Performed by: INTERNAL MEDICINE

## 2024-06-06 PROCEDURE — 1160F RVW MEDS BY RX/DR IN RCRD: CPT | Performed by: ORTHOPAEDIC SURGERY

## 2024-06-06 PROCEDURE — 73610 X-RAY EXAM OF ANKLE: CPT | Mod: RT

## 2024-06-06 PROCEDURE — 93000 ELECTROCARDIOGRAM COMPLETE: CPT | Performed by: INTERNAL MEDICINE

## 2024-06-06 PROCEDURE — 1159F MED LIST DOCD IN RCRD: CPT | Performed by: ORTHOPAEDIC SURGERY

## 2024-06-06 PROCEDURE — 1159F MED LIST DOCD IN RCRD: CPT | Performed by: INTERNAL MEDICINE

## 2024-06-06 PROCEDURE — 73610 X-RAY EXAM OF ANKLE: CPT | Mod: RIGHT SIDE | Performed by: STUDENT IN AN ORGANIZED HEALTH CARE EDUCATION/TRAINING PROGRAM

## 2024-06-06 PROCEDURE — 99024 POSTOP FOLLOW-UP VISIT: CPT | Performed by: ORTHOPAEDIC SURGERY

## 2024-06-06 PROCEDURE — 99214 OFFICE O/P EST MOD 30 MIN: CPT | Performed by: INTERNAL MEDICINE

## 2024-06-06 ASSESSMENT — ENCOUNTER SYMPTOMS
LOSS OF SENSATION IN FEET: 0
OCCASIONAL FEELINGS OF UNSTEADINESS: 0
DEPRESSION: 1

## 2024-06-06 NOTE — LETTER
June 6, 2024     Ileana Mclaughlin DO  [address]  [facility] [address]    Patient: Sabina Chopra   YOB: 1955   Date of Visit: 6/6/2024       Dear Dr. Ileana Mclaughlin DO:    Thank you for referring Sabina Chopra to me for evaluation. Below are my notes for this consultation.  If you have questions, please do not hesitate to call me. I look forward to following your patient along with you.       Sincerely,     Nestor Villegas MD      CC: No Recipients  ______________________________________________________________________________________    This 68-year-old woman returns today with her sister for 3-month follow-up on her bimalleolar ankle fracture.  The patient is without complaints at this time.    Physical Exam:  The patient is well-nourished and well-developed and in no acute distress. The patient displayed normal mood and affect.  Patient's respirations appear to be regular and unlabored.  There is no tenderness at the fracture sites.There is mild swelling of the ankle.  Ankle subtalar midtarsal motion reveals minimal restriction.  The calf is soft and nontender and without swelling.  The neurovascular exam is intact.  Good alignment is noted.    Imaging:  I personally reviewed and measured the radiographs including AP and lateral views of the extremity and they revealed healing fractures in good alignment.    Impression & Plan:   It is my impression this patient's fractures are healing in good alignment.  Patient can remain free of immobilization.  She should wear good support shoe.    I have explained to the patient that the fracture has not reached full-strength.  There is an increased risk of refracture.  It will probably take 3-6 months for the fracture reach full strength.  The patient should be careful with their activities so as to prevent reinjury.      Note dictated with Foody software.  Completed without full type editing  and sent to avoid delay.
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
## 2024-06-06 NOTE — PROGRESS NOTES
This 68-year-old woman returns today with her sister for 3-month follow-up on her bimalleolar ankle fracture.  The patient is without complaints at this time.    Physical Exam:  The patient is well-nourished and well-developed and in no acute distress. The patient displayed normal mood and affect.  Patient's respirations appear to be regular and unlabored.  There is no tenderness at the fracture sites.There is mild swelling of the ankle.  Ankle subtalar midtarsal motion reveals minimal restriction.  The calf is soft and nontender and without swelling.  The neurovascular exam is intact.  Good alignment is noted.    Imaging:  I personally reviewed and measured the radiographs including AP and lateral views of the extremity and they revealed healing fractures in good alignment.    Impression & Plan:   It is my impression this patient's fractures are healing in good alignment.  Patient can remain free of immobilization.  She should wear good support shoe.    I have explained to the patient that the fracture has not reached full-strength.  There is an increased risk of refracture.  It will probably take 3-6 months for the fracture reach full strength.  The patient should be careful with their activities so as to prevent reinjury.      Note dictated with "[employer] software.  Completed without full type editing and sent to avoid delay.
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
## 2024-06-07 ENCOUNTER — PREP FOR PROCEDURE (OUTPATIENT)
Dept: UROLOGY | Facility: CLINIC | Age: 69
End: 2024-06-07
Payer: MEDICARE

## 2024-06-07 DIAGNOSIS — R15.0 INCOMPLETE DEFECATION: Primary | ICD-10-CM

## 2024-06-11 DIAGNOSIS — R55 SYNCOPE, UNSPECIFIED SYNCOPE TYPE: Primary | ICD-10-CM

## 2024-06-13 ENCOUNTER — TELEPHONE (OUTPATIENT)
Dept: CARDIOLOGY | Facility: CLINIC | Age: 69
End: 2024-06-13
Payer: MEDICARE

## 2024-06-13 NOTE — TELEPHONE ENCOUNTER
6/13 9:09 am - [facility] for patient - loop recorder implant w/ Dr. Roy 6/19 9am - arrive at 8am.   [facility]      -[facility]

## 2024-06-18 ENCOUNTER — TELEPHONE (OUTPATIENT)
Dept: CARDIOLOGY | Facility: HOSPITAL | Age: 69
End: 2024-06-18
Payer: MEDICARE

## 2024-06-19 ENCOUNTER — HOSPITAL ENCOUNTER (OUTPATIENT)
Dept: CARDIOLOGY | Facility: HOSPITAL | Age: 69
Discharge: HOME | End: 2024-06-19
Payer: MEDICARE

## 2024-06-19 VITALS
RESPIRATION RATE: 16 BRPM | SYSTOLIC BLOOD PRESSURE: 123 MMHG | HEART RATE: 74 BPM | DIASTOLIC BLOOD PRESSURE: 80 MMHG | OXYGEN SATURATION: 97 % | TEMPERATURE: 98.2 F

## 2024-06-19 DIAGNOSIS — R55 SYNCOPE, UNSPECIFIED SYNCOPE TYPE: ICD-10-CM

## 2024-06-19 PROCEDURE — 2780000003 HC OR 278 NO HCPCS

## 2024-06-19 PROCEDURE — 2500000005 HC RX 250 GENERAL PHARMACY W/O HCPCS: Performed by: INTERNAL MEDICINE

## 2024-06-19 PROCEDURE — 33285 INSJ SUBQ CAR RHYTHM MNTR: CPT

## 2024-06-19 PROCEDURE — 33285 INSJ SUBQ CAR RHYTHM MNTR: CPT | Performed by: INTERNAL MEDICINE

## 2024-06-19 RX ORDER — LIDOCAINE HYDROCHLORIDE 10 MG/ML
INJECTION, SOLUTION EPIDURAL; INFILTRATION; INTRACAUDAL; PERINEURAL AS NEEDED
Status: DISCONTINUED | OUTPATIENT
Start: 2024-06-19 | End: 2024-06-19 | Stop reason: HOSPADM

## 2024-06-19 ASSESSMENT — PAIN SCALES - GENERAL
PAINLEVEL_OUTOF10: 0 - NO PAIN
PAINLEVEL_OUTOF10: 0 - NO PAIN

## 2024-06-19 ASSESSMENT — ENCOUNTER SYMPTOMS
OCCASIONAL FEELINGS OF UNSTEADINESS: 0
DEPRESSION: 0
LOSS OF SENSATION IN FEET: 0

## 2024-06-19 ASSESSMENT — PAIN - FUNCTIONAL ASSESSMENT
PAIN_FUNCTIONAL_ASSESSMENT: 0-10
PAIN_FUNCTIONAL_ASSESSMENT: 0-10

## 2024-06-19 NOTE — DISCHARGE INSTRUCTIONS
You are all finished with your loop recorder     Please leave your dressing on for three days. Do not get it wet.     After three days from your procedure you may remove your dressing, gently clean the area with soap and water, rinse and dry. Leave uncovered.     Watch out for signs and symptoms of infection and Notify Dr. Roy's office at [phone] or the Cath Lab NP Kaela Donald at [phone] which could include any of the following:   Redness, drainage, fever, chills or increased pain at the site.     If you have any pain you can use over the counter medications and/or ice as needed     Please call if you have any questions, MISTY Donald [phone]

## 2024-06-19 NOTE — PRE-SEDATION DOCUMENTATION
Sedation Plan          Risks, benefits, and alternatives discussed with patient.    -local anesthetic only

## 2024-06-25 ENCOUNTER — HOSPITAL ENCOUNTER (OUTPATIENT)
Dept: CARDIOLOGY | Facility: HOSPITAL | Age: 69
Discharge: HOME | End: 2024-06-25
Payer: MEDICARE

## 2024-06-25 DIAGNOSIS — I49.9 CARDIAC ARRHYTHMIA, UNSPECIFIED: ICD-10-CM

## 2024-06-25 DIAGNOSIS — R55 SYNCOPE AND COLLAPSE: ICD-10-CM

## 2024-07-01 ENCOUNTER — APPOINTMENT (OUTPATIENT)
Dept: CARDIOLOGY | Facility: CLINIC | Age: 69
End: 2024-07-01
Payer: MEDICARE

## 2024-07-02 ENCOUNTER — PREP FOR PROCEDURE (OUTPATIENT)
Dept: UROLOGY | Facility: CLINIC | Age: 69
End: 2024-07-02
Payer: MEDICARE

## 2024-07-02 DIAGNOSIS — R15.0 INCOMPLETE DEFECATION: Primary | ICD-10-CM

## 2024-07-02 RX ORDER — CEFAZOLIN SODIUM 2 G/100ML
2 INJECTION, SOLUTION INTRAVENOUS ONCE
OUTPATIENT
Start: 2024-07-02 | End: 2024-07-02

## 2024-07-03 RX ORDER — MONTELUKAST SODIUM 4 MG/1
2 TABLET, CHEWABLE ORAL 2 TIMES DAILY
Qty: 120 TABLET | Refills: 0 | Status: SHIPPED | OUTPATIENT
Start: 2024-07-03

## 2024-07-06 DIAGNOSIS — R15.1 FECAL SOILING: ICD-10-CM

## 2024-07-06 DIAGNOSIS — K21.9 GASTROESOPHAGEAL REFLUX DISEASE WITHOUT ESOPHAGITIS: ICD-10-CM

## 2024-07-06 DIAGNOSIS — N32.81 OAB (OVERACTIVE BLADDER): ICD-10-CM

## 2024-07-06 DIAGNOSIS — K59.1 FUNCTIONAL DIARRHEA: ICD-10-CM

## 2024-07-08 RX ORDER — LOPERAMIDE HYDROCHLORIDE 2 MG/1
CAPSULE ORAL
Qty: 80 CAPSULE | Refills: 0 | Status: SHIPPED | OUTPATIENT
Start: 2024-07-08

## 2024-07-08 RX ORDER — MONTELUKAST SODIUM 4 MG/1
2 TABLET, CHEWABLE ORAL 2 TIMES DAILY
Qty: 120 TABLET | Refills: 0 | Status: SHIPPED | OUTPATIENT
Start: 2024-07-08

## 2024-07-08 RX ORDER — TRIMETHOPRIM 100 MG/1
100 TABLET ORAL DAILY
Qty: 90 TABLET | Refills: 0 | Status: SHIPPED | OUTPATIENT
Start: 2024-07-08

## 2024-07-08 RX ORDER — PANTOPRAZOLE SODIUM 40 MG/1
40 TABLET, DELAYED RELEASE ORAL DAILY
Qty: 30 TABLET | Refills: 0 | Status: SHIPPED | OUTPATIENT
Start: 2024-07-08

## 2024-07-10 ENCOUNTER — PREP FOR PROCEDURE (OUTPATIENT)
Dept: UROLOGY | Facility: CLINIC | Age: 69
End: 2024-07-10
Payer: MEDICARE

## 2024-07-10 DIAGNOSIS — R15.0 INCOMPLETE DEFECATION: Primary | ICD-10-CM

## 2024-07-10 RX ORDER — CEFAZOLIN SODIUM 2 G/100ML
2 INJECTION, SOLUTION INTRAVENOUS ONCE
OUTPATIENT
Start: 2024-07-10 | End: 2024-07-10

## 2024-07-12 ENCOUNTER — ANESTHESIA EVENT (OUTPATIENT)
Dept: OPERATING ROOM | Facility: HOSPITAL | Age: 69
End: 2024-07-12
Payer: MEDICARE

## 2024-07-15 ENCOUNTER — HOSPITAL ENCOUNTER (OUTPATIENT)
Facility: HOSPITAL | Age: 69
Setting detail: OUTPATIENT SURGERY
Discharge: HOME | End: 2024-07-15
Attending: STUDENT IN AN ORGANIZED HEALTH CARE EDUCATION/TRAINING PROGRAM | Admitting: STUDENT IN AN ORGANIZED HEALTH CARE EDUCATION/TRAINING PROGRAM
Payer: MEDICARE

## 2024-07-15 ENCOUNTER — ANESTHESIA (OUTPATIENT)
Dept: OPERATING ROOM | Facility: HOSPITAL | Age: 69
End: 2024-07-15
Payer: MEDICARE

## 2024-07-15 VITALS
SYSTOLIC BLOOD PRESSURE: 103 MMHG | DIASTOLIC BLOOD PRESSURE: 67 MMHG | WEIGHT: 184.3 LBS | OXYGEN SATURATION: 97 % | BODY MASS INDEX: 32.66 KG/M2 | HEIGHT: 63 IN | HEART RATE: 66 BPM | RESPIRATION RATE: 18 BRPM | TEMPERATURE: 97.7 F

## 2024-07-15 DIAGNOSIS — R15.0 INCOMPLETE DEFECATION: ICD-10-CM

## 2024-07-15 DIAGNOSIS — R15.9 INCONTINENCE OF FECES, UNSPECIFIED FECAL INCONTINENCE TYPE: ICD-10-CM

## 2024-07-15 DIAGNOSIS — G89.18 POST-OP PAIN: Primary | ICD-10-CM

## 2024-07-15 PROCEDURE — 2500000004 HC RX 250 GENERAL PHARMACY W/ HCPCS (ALT 636 FOR OP/ED): Performed by: NURSE ANESTHETIST, CERTIFIED REGISTERED

## 2024-07-15 PROCEDURE — 3600000006 HC OR TIME - EACH INCREMENTAL 1 MINUTE - PROCEDURE LEVEL ONE: Performed by: STUDENT IN AN ORGANIZED HEALTH CARE EDUCATION/TRAINING PROGRAM

## 2024-07-15 PROCEDURE — 2500000005 HC RX 250 GENERAL PHARMACY W/O HCPCS: Performed by: NURSE ANESTHETIST, CERTIFIED REGISTERED

## 2024-07-15 PROCEDURE — 2500000004 HC RX 250 GENERAL PHARMACY W/ HCPCS (ALT 636 FOR OP/ED): Performed by: ANESTHESIOLOGY

## 2024-07-15 PROCEDURE — 7100000010 HC PHASE TWO TIME - EACH INCREMENTAL 1 MINUTE: Performed by: STUDENT IN AN ORGANIZED HEALTH CARE EDUCATION/TRAINING PROGRAM

## 2024-07-15 PROCEDURE — 45990 SURG DX EXAM ANORECTAL: CPT | Performed by: STUDENT IN AN ORGANIZED HEALTH CARE EDUCATION/TRAINING PROGRAM

## 2024-07-15 PROCEDURE — 7100000009 HC PHASE TWO TIME - INITIAL BASE CHARGE: Performed by: STUDENT IN AN ORGANIZED HEALTH CARE EDUCATION/TRAINING PROGRAM

## 2024-07-15 PROCEDURE — 3700000002 HC GENERAL ANESTHESIA TIME - EACH INCREMENTAL 1 MINUTE: Performed by: STUDENT IN AN ORGANIZED HEALTH CARE EDUCATION/TRAINING PROGRAM

## 2024-07-15 PROCEDURE — 3600000001 HC OR TIME - INITIAL BASE CHARGE - PROCEDURE LEVEL ONE: Performed by: STUDENT IN AN ORGANIZED HEALTH CARE EDUCATION/TRAINING PROGRAM

## 2024-07-15 PROCEDURE — 3700000001 HC GENERAL ANESTHESIA TIME - INITIAL BASE CHARGE: Performed by: STUDENT IN AN ORGANIZED HEALTH CARE EDUCATION/TRAINING PROGRAM

## 2024-07-15 RX ORDER — KETOROLAC TROMETHAMINE 10 MG/1
10 TABLET, FILM COATED ORAL EVERY 6 HOURS PRN
Qty: 20 TABLET | Refills: 0 | Status: SHIPPED | OUTPATIENT
Start: 2024-07-15

## 2024-07-15 RX ORDER — MIDAZOLAM HYDROCHLORIDE 1 MG/ML
INJECTION INTRAMUSCULAR; INTRAVENOUS AS NEEDED
Status: DISCONTINUED | OUTPATIENT
Start: 2024-07-15 | End: 2024-07-15

## 2024-07-15 RX ORDER — CEFAZOLIN SODIUM 2 G/100ML
2 INJECTION, SOLUTION INTRAVENOUS ONCE
Status: DISCONTINUED | OUTPATIENT
Start: 2024-07-15 | End: 2024-07-15 | Stop reason: HOSPADM

## 2024-07-15 RX ORDER — SODIUM CHLORIDE, SODIUM LACTATE, POTASSIUM CHLORIDE, CALCIUM CHLORIDE 600; 310; 30; 20 MG/100ML; MG/100ML; MG/100ML; MG/100ML
100 INJECTION, SOLUTION INTRAVENOUS CONTINUOUS
Status: DISCONTINUED | OUTPATIENT
Start: 2024-07-15 | End: 2024-07-15 | Stop reason: HOSPADM

## 2024-07-15 RX ORDER — MIDAZOLAM HYDROCHLORIDE 1 MG/ML
INJECTION, SOLUTION INTRAMUSCULAR; INTRAVENOUS AS NEEDED
Status: DISCONTINUED | OUTPATIENT
Start: 2024-07-15 | End: 2024-07-15

## 2024-07-15 RX ORDER — ACETAMINOPHEN 500 MG
1000 TABLET ORAL EVERY 6 HOURS PRN
Qty: 20 TABLET | Refills: 0 | Status: SHIPPED | OUTPATIENT
Start: 2024-07-15 | End: 2024-07-20

## 2024-07-15 RX ORDER — OXYCODONE HYDROCHLORIDE 5 MG/1
5 TABLET ORAL EVERY 4 HOURS PRN
Status: DISCONTINUED | OUTPATIENT
Start: 2024-07-15 | End: 2024-07-15 | Stop reason: HOSPADM

## 2024-07-15 RX ORDER — DROPERIDOL 2.5 MG/ML
0.62 INJECTION, SOLUTION INTRAMUSCULAR; INTRAVENOUS ONCE AS NEEDED
Status: DISCONTINUED | OUTPATIENT
Start: 2024-07-15 | End: 2024-07-15 | Stop reason: HOSPADM

## 2024-07-15 RX ORDER — PROPOFOL 10 MG/ML
INJECTION, EMULSION INTRAVENOUS AS NEEDED
Status: DISCONTINUED | OUTPATIENT
Start: 2024-07-15 | End: 2024-07-15

## 2024-07-15 RX ORDER — FENTANYL CITRATE 50 UG/ML
INJECTION, SOLUTION INTRAMUSCULAR; INTRAVENOUS AS NEEDED
Status: DISCONTINUED | OUTPATIENT
Start: 2024-07-15 | End: 2024-07-15

## 2024-07-15 RX ORDER — LIDOCAINE HYDROCHLORIDE 10 MG/ML
INJECTION, SOLUTION EPIDURAL; INFILTRATION; INTRACAUDAL; PERINEURAL AS NEEDED
Status: DISCONTINUED | OUTPATIENT
Start: 2024-07-15 | End: 2024-07-15

## 2024-07-15 RX ORDER — COLESTIPOL HYDROCHLORIDE 5 G/5G
5 GRANULE, FOR SUSPENSION ORAL 3 TIMES DAILY
Qty: 450 G | Refills: 11 | Status: SHIPPED | OUTPATIENT
Start: 2024-07-15 | End: 2025-07-15

## 2024-07-15 RX ORDER — ONDANSETRON HYDROCHLORIDE 2 MG/ML
4 INJECTION, SOLUTION INTRAVENOUS ONCE AS NEEDED
Status: DISCONTINUED | OUTPATIENT
Start: 2024-07-15 | End: 2024-07-15 | Stop reason: HOSPADM

## 2024-07-15 RX ORDER — TRAMADOL HYDROCHLORIDE 50 MG/1
50 TABLET ORAL EVERY 6 HOURS PRN
Qty: 20 TABLET | Refills: 0 | Status: SHIPPED | OUTPATIENT
Start: 2024-07-15 | End: 2024-07-20

## 2024-07-15 SDOH — HEALTH STABILITY: MENTAL HEALTH: CURRENT SMOKER: 0

## 2024-07-15 ASSESSMENT — PAIN - FUNCTIONAL ASSESSMENT
PAIN_FUNCTIONAL_ASSESSMENT: 0-10

## 2024-07-15 ASSESSMENT — COLUMBIA-SUICIDE SEVERITY RATING SCALE - C-SSRS
1. IN THE PAST MONTH, HAVE YOU WISHED YOU WERE DEAD OR WISHED YOU COULD GO TO SLEEP AND NOT WAKE UP?: NO
6. HAVE YOU EVER DONE ANYTHING, STARTED TO DO ANYTHING, OR PREPARED TO DO ANYTHING TO END YOUR LIFE?: NO
2. HAVE YOU ACTUALLY HAD ANY THOUGHTS OF KILLING YOURSELF?: NO

## 2024-07-15 ASSESSMENT — PAIN SCALES - GENERAL
PAINLEVEL_OUTOF10: 0 - NO PAIN
PAIN_LEVEL: 2
PAINLEVEL_OUTOF10: 0 - NO PAIN
PAINLEVEL_OUTOF10: 0 - NO PAIN

## 2024-07-15 NOTE — H&P
History Of Present Illness  Sabnia Chopra is a 68 y.o. female presenting with with fecal incontinence and will undergo rectal bulking .     Past Medical History  Past Medical History:   Diagnosis Date    Compression fracture of L2 lumbar vertebra (Multi) 02/07/2023    COVID-19 02/07/2023    Hypersomnia, unspecified     Hypersomnia    Personal history of other diseases of the digestive system     History of colitis    Personal history of other diseases of the musculoskeletal system and connective tissue     History of fibromyalgia    Personal history of other diseases of the musculoskeletal system and connective tissue     History of arthritis    Personal history of other diseases of the nervous system and sense organs     History of restless legs syndrome    Personal history of other endocrine, nutritional and metabolic disease     History of thyroid disorder    Personal history of other mental and behavioral disorders     History of anxiety    Personal history of other mental and behavioral disorders     History of depression    Personal history of other specified conditions     History of insomnia    Personal history of other specified conditions     History of incontinence of feces    Syncope and collapse 09/04/2019    Syncope and collapse       Surgical History  Past Surgical History:   Procedure Laterality Date    OTHER SURGICAL HISTORY  11/19/2018    Hernia repair    OTHER SURGICAL HISTORY  11/19/2018    Elbow surgery    OTHER SURGICAL HISTORY  11/19/2018    Hand surgery    OTHER SURGICAL HISTORY  11/14/2018    Colectomy total        Social History  She reports that she has been smoking cigarettes. She has been exposed to tobacco smoke. She has never used smokeless tobacco. She reports that she does not drink alcohol and does not use drugs.    Family History  Family History   Problem Relation Name Age of Onset    Diabetes Sister      Breast cancer Other grandmother         Allergies  Adhesive, Cefaclor,  "Famotidine, Hydromorphone, and Metronidazole         Physical Exam  General: Appears comfortable and in no apparent distress, well nourished  Head: Normocephalic, atraumatic  Neck: supple, trachea midline  Respiratory: respirations unlabored, no wheezes, and no use of accessory muscles  Cardiovascular: no peripheral edema, extremities perfused  Abdomen: soft, non-distended, non-tender, no rebound or guarding, no hernia and no CVA tenderness   Lymphatic: no lymphadenopathy noted  Skin: normal turgor, no visible rashes or lesions  Neurologic: grossly intact, oriented to person, place, and time  Psychiatric: mood and affect appropriate  Musculoskeletal: no difficulty with ambulation     Last Recorded Vitals  Blood pressure 136/90, pulse 98, temperature 36.6 °C (97.9 °F), resp. rate 16, height 1.6 m (5' 3\"), weight 83.6 kg (184 lb 4.9 oz).    Relevant Results             Assessment/Plan   Principal Problem:    Fecal incontinence         Proceed with procedure, patient consented.      Janet Da Silva PA-C    "

## 2024-07-15 NOTE — ANESTHESIA POSTPROCEDURE EVALUATION
Patient: Sabina Chopra    Procedure Summary       Date: 07/15/24 Room / Location: [facility] OR 07 / Virtual [facility] OR    Anesthesia Start: 1610 Anesthesia Stop: 1646    Procedure: EXAM UNDER ANESTHESIA ANUS/RECTUM Diagnosis:       Incomplete defecation      (Incomplete defecation [R15.0])    Surgeons: Osmel Santillan MD Responsible Provider: Ezra James MD    Anesthesia Type: MAC ASA Status: 3            Anesthesia Type: MAC    Vitals Value Taken Time   /67 07/15/24 1712   Temp 36.5 °C (97.7 °F) 07/15/24 1642   Pulse 66 07/15/24 1712   Resp 18 07/15/24 1712   SpO2 97 % 07/15/24 1712       Anesthesia Post Evaluation    Patient location during evaluation: PACU  Patient participation: complete - patient participated  Level of consciousness: awake  Pain score: 2  Pain management: adequate  Multimodal analgesia pain management approach  Airway patency: patent  Two or more strategies used to mitigate risk of obstructive sleep apnea  Cardiovascular status: acceptable  Respiratory status: acceptable  Hydration status: acceptable  Postoperative Nausea and Vomiting: none    No notable events documented.

## 2024-07-15 NOTE — ANESTHESIA PREPROCEDURE EVALUATION
Patient: Sabina Chopra    Procedure Information       Date/Time: 07/15/24 2727    Procedure: EXAM UNDER ANESTHESIA ANUS/RECTUM - anal bulking, solesta rep needed    Location: [facility] OR 07 / [facility] OR    Surgeons: Osmel Santillan MD            Relevant Problems   Anesthesia (within normal limits)      Cardiac   (+) Arrhythmia   (+) Chest pain   (+) Hyperlipidemia      Neuro   (+) Anxiety disorder   (+) Chronic lumbar radiculopathy   (+) Chronic posttraumatic stress disorder   (+) Depressive disorder   (+) Major depression, recurrent, chronic (CMS-HCC)   (+) Panic disorder without agoraphobia   (+) Recurrent major depression in partial remission (CMS-HCC)      GI   (+) Chronic diarrhea   (+) GERD (gastroesophageal reflux disease)   (+) Rectal bleeding      /Renal   (+) Acute UTI   (+) Acute urinary tract infection      Endocrine   (+) Hypothyroidism      Musculoskeletal   (+) Degeneration of intervertebral disc of lumbar region   (+) Fibromyalgia   (+) Lumbar spondylosis      ID   (+) Acute UTI   (+) Acute urinary tract infection   (+) Bacterial enterocolitis   (+) Bacterial intestinal infectious disease   (+) Cutaneous fungal infection   (+) Disease due to severe acute respiratory syndrome coronavirus 2 (SARS-CoV-2)   (+) Small intestinal bacterial overgrowth      Skin   (+) Rash       Clinical information reviewed:   Tobacco  Allergies  Meds  Problems  Med Hx  Surg Hx   Fam Hx  Soc   Hx        NPO Detail:  NPO/Void Status  Date of Last Liquid: 07/14/24  Date of Last Solid: 07/14/24         Physical Exam    Airway  Mallampati: II  TM distance: >3 FB  Neck ROM: full     Cardiovascular - normal exam     Dental - normal exam     Pulmonary - normal exam     Abdominal - normal exam             Anesthesia Plan    History of general anesthesia?: yes  History of complications of general anesthesia?: no    ASA 3     MAC     The patient is not a current smoker.    intravenous induction   Anesthetic plan and risks  discussed with patient.  Use of blood products discussed with who consented to blood products.    Plan discussed with CRNA.

## 2024-07-15 NOTE — OP NOTE
EXAM UNDER ANESTHESIA ANUS/RECTUM Operative Note     Date: 7/15/2024  OR Location: [facility] OR    Name: Sabina Chopra, : 1955, Age: 68 y.o., MRN: [id number], Sex: female    Diagnosis  Pre-op Diagnosis      * Incomplete defecation [R15.0] Post-op Diagnosis     * Incomplete defecation [R15.0]     Procedures  EXAM UNDER ANESTHESIA ANUS/RECTUM  54001 - MT ANRCT XM SURG REQ ANES GENERAL SPI/EDRL DX      Surgeons      * Osmel Santillan - Primary    Resident/Fellow/Other Assistant:  Surgeons and Role:     * Janet Da Silva PA-C - DAVID First Assist    Procedure Summary  Anesthesia: Monitor Anesthesia Care  ASA: III  Anesthesia Staff: Anesthesiologist: Ezra James MD  CRNA: JANES Mays-CRNA  Estimated Blood Loss: 0 mL  Intra-op Medications: Administrations occurring from 1445 to 1530 on 07/15/24:  * No intraprocedure medications in log *           Anesthesia Record               Intraprocedure I/O Totals          Intake    Propofol Drip 0.00 mL    The total shown is the total volume documented since Anesthesia Start was filed.    lactated Ringer's infusion 900.00 mL    Total Intake 900 mL          Specimen: No specimens collected     Staff:   Circulator: Yessica Kramer Person: Adia MAGUIREA: Ezra         Drains and/or Catheters: * None in log *    Tourniquet Times:         Implants:     Findings: copious loose green stool noted    Indications: Sabina Chopra is an 68 y.o. female who is having surgery for Incomplete defecation [R15.0].     The patient was seen in the preoperative area. The risks, benefits, complications, treatment options, non-operative alternatives, expected recovery and outcomes were discussed with the patient. The possibilities of reaction to medication, pulmonary aspiration, injury to surrounding structures, bleeding, recurrent infection, the need for additional procedures, failure to diagnose a condition, and creating a complication requiring transfusion or operation were discussed with the  patient. The patient concurred with the proposed plan, giving informed consent.  The site of surgery was properly noted/marked if necessary per policy. The patient has been actively warmed in preoperative area. Preoperative antibiotics are not indicated. Venous thrombosis prophylaxis are not indicated.    Procedure Details: After patient was placed in lateral decubitus position and prepped. We inserted a lighted anoscope into the anal cavity and there was copius green colored loose stool noted in the anorectal cavity with collapsing mucosa. Given concern for rectal prolapse bulking was not performed.   Complications:  None; patient tolerated the procedure well.    Disposition: PACU - hemodynamically stable.  Condition: stable         Additional Details:     Attending Attestation: I was present and scrubbed for the entire procedure.    Osmel Santillan  Phone Number: [phone]

## 2024-07-24 ENCOUNTER — APPOINTMENT (OUTPATIENT)
Dept: CARDIOLOGY | Facility: HOSPITAL | Age: 69
End: 2024-07-24
Payer: MEDICARE

## 2024-07-29 NOTE — PROGRESS NOTES
HPI    Sabina Chopra is a 69 y.o. female with a history of fecal incontinence who is s/p STC with [facility] in 2007 for chronic constipation. She was seen by Dr. Santillan for recurrent UTI and urinary incontinence and was using a fecal pessary. On 7/15/24 she underwent EUA with plans for anal bulking but copious amounts of loose green stool was found and there was a concern for prolapse so bulking was not performed.     Colonoscopy 1/24/2019 ([facility]): Ileitis. Biopsied. Path demonstrating chronic enteritis/ileitis with focal acute inflammation. Patent end-to-end ileo-rectal anastomosis characterized by healthy appearing mucosa. One diminutive polyp in the rectum, resected and retrieved. Path TA. The exam was otherwise normal. Repeat in 5-10 years.     Non-smoker/No ETOH/No Illicit drug use  PMH:   PSH:  No family history of CRC or IBD  Employment:     Past Medical History:   Diagnosis Date    Compression fracture of L2 lumbar vertebra (Multi) 02/07/2023    COVID-19 02/07/2023    Hypersomnia, unspecified     Hypersomnia    Personal history of other diseases of the digestive system     History of colitis    Personal history of other diseases of the musculoskeletal system and connective tissue     History of fibromyalgia    Personal history of other diseases of the musculoskeletal system and connective tissue     History of arthritis    Personal history of other diseases of the nervous system and sense organs     History of restless legs syndrome    Personal history of other endocrine, nutritional and metabolic disease     History of thyroid disorder    Personal history of other mental and behavioral disorders     History of anxiety    Personal history of other mental and behavioral disorders     History of depression    Personal history of other specified conditions     History of insomnia    Personal history of other specified conditions     History of incontinence of feces    Syncope and collapse 09/04/2019    Syncope and  collapse       Past Surgical History:   Procedure Laterality Date    OTHER SURGICAL HISTORY  11/19/2018    Hernia repair    OTHER SURGICAL HISTORY  11/19/2018    Elbow surgery    OTHER SURGICAL HISTORY  11/19/2018    Hand surgery    OTHER SURGICAL HISTORY  11/14/2018    Colectomy total       Allergies   Allergen Reactions    Adhesive Unknown and Rash     Adhesive bandages MISC    Cefaclor Unknown    Famotidine Unknown    Hydromorphone Unknown    Metronidazole Unknown       Review of Systems    Physical Exam    Assessment and Plan:

## 2024-07-30 ENCOUNTER — APPOINTMENT (OUTPATIENT)
Dept: SURGERY | Facility: CLINIC | Age: 69
End: 2024-07-30
Payer: MEDICARE

## 2024-07-30 NOTE — PROGRESS NOTES
HPI    Sabina Chopra is a 69 y.o. female with a history of fecal incontinence who is s/p STC with [facility]NAB[facility] in 2007 for chronic constipation. She was seen by Dr. Santillan for recurrent UTI and urinary incontinence and was using a fecal pessary. On 7/15/24 she underwent EUA with plans for anal bulking but copious amounts of loose green stool was found and there was a concern for prolapse so bulking was not performed.     She reports worsening FI since STC, but even worse in past year. She has 4-5 accidents per day; unpredictable. She reports both urgency as well as spontaneous incontinence. Stools are watery and loose. It has been impacting her QoL. She avoids eating and leaving the house. She occasionally has rectal pain (pressure like) with Bms. She also sporadically will have abdominal pain. Feels kind of down about her whole situation. She does not believe she has protruding tissue. She has tried taking 1 Imodium up to 4 times per day and it did not improve her symptoms. She has currently on 1 lomotil once per day.     Colonoscopy 1/24/2019 ([facility]): Ileitis. Biopsied. Path demonstrating chronic enteritis/ileitis with focal acute inflammation. Patent end-to-end ileo-rectal anastomosis characterized by healthy appearing mucosa. One diminutive polyp in the rectum, resected and retrieved. Path TA. The exam was otherwise normal. Repeat in 5-10 years.     Non-smoker/No ETOH/No Illicit drug use  PMH:   PSH:  No family history of CRC or IBD  Employment:     Past Medical History:   Diagnosis Date    Compression fracture of L2 lumbar vertebra (Multi) 02/07/2023    COVID-19 02/07/2023    Hypersomnia, unspecified     Hypersomnia    Personal history of other diseases of the digestive system     History of colitis    Personal history of other diseases of the musculoskeletal system and connective tissue     History of fibromyalgia    Personal history of other diseases of the musculoskeletal system and connective tissue     History of  arthritis    Personal history of other diseases of the nervous system and sense organs     History of restless legs syndrome    Personal history of other endocrine, nutritional and metabolic disease     History of thyroid disorder    Personal history of other mental and behavioral disorders     History of anxiety    Personal history of other mental and behavioral disorders     History of depression    Personal history of other specified conditions     History of insomnia    Personal history of other specified conditions     History of incontinence of feces    Syncope and collapse 09/04/2019    Syncope and collapse       Past Surgical History:   Procedure Laterality Date    OTHER SURGICAL HISTORY  11/19/2018    Hernia repair    OTHER SURGICAL HISTORY  11/19/2018    Elbow surgery    OTHER SURGICAL HISTORY  11/19/2018    Hand surgery    OTHER SURGICAL HISTORY  11/14/2018    Colectomy total       Allergies   Allergen Reactions    Adhesive Unknown and Rash     Adhesive bandages MISC    Cefaclor Unknown    Famotidine Unknown    Hydromorphone Unknown    Metronidazole Unknown       Review of Systems    Physical Exam  Moderately low resting tone, good squeeze  Minimal mucosal prolapse    Assessment and Plan:   Fecal incontinence. Small amount of mucosal only prolapse - this is asymptomatic. No full thickness rectal prolapse. I think it would be ok to go ahead with bulking as suggested by Dr. Santillan.   However, I strongly recommend pelvic floor PT.   We also discussed that getting her bowel movements more solid will likely be the most helpful.   I recommend:   -continue cholestyramine  -add psyllium. I suggested wafers but she prefers powder dissolved in 4oz water  -increasing Imodium and Lomotil. She will titrate up slowly to a maximum of 2 pills of each QID. Controlled substance agreement signed today.   -follow up with me as needed

## 2024-08-07 ENCOUNTER — APPOINTMENT (OUTPATIENT)
Dept: CARDIOLOGY | Facility: HOSPITAL | Age: 69
End: 2024-08-07
Payer: MEDICARE

## 2024-08-07 DIAGNOSIS — M16.0 PRIMARY OSTEOARTHRITIS OF BOTH HIPS: ICD-10-CM

## 2024-08-07 RX ORDER — DICLOFENAC SODIUM 10 MG/G
GEL TOPICAL
Qty: 350 G | Refills: 2 | Status: SHIPPED | OUTPATIENT
Start: 2024-08-07

## 2024-08-08 ENCOUNTER — APPOINTMENT (OUTPATIENT)
Dept: SURGERY | Facility: CLINIC | Age: 69
End: 2024-08-08
Payer: MEDICARE

## 2024-08-08 VITALS
WEIGHT: 145 LBS | HEART RATE: 76 BPM | DIASTOLIC BLOOD PRESSURE: 82 MMHG | RESPIRATION RATE: 16 BRPM | SYSTOLIC BLOOD PRESSURE: 123 MMHG | BODY MASS INDEX: 25.69 KG/M2 | HEIGHT: 63 IN

## 2024-08-08 DIAGNOSIS — R15.0 INCOMPLETE DEFECATION: ICD-10-CM

## 2024-08-08 DIAGNOSIS — R15.9 FULL INCONTINENCE OF FECES: Primary | ICD-10-CM

## 2024-08-08 PROCEDURE — 3008F BODY MASS INDEX DOCD: CPT | Performed by: SURGERY

## 2024-08-08 PROCEDURE — 1126F AMNT PAIN NOTED NONE PRSNT: CPT | Performed by: SURGERY

## 2024-08-08 PROCEDURE — 99215 OFFICE O/P EST HI 40 MIN: CPT | Performed by: SURGERY

## 2024-08-08 RX ORDER — DIPHENOXYLATE HYDROCHLORIDE AND ATROPINE SULFATE 2.5; .025 MG/1; MG/1
1 TABLET ORAL 4 TIMES DAILY PRN
COMMUNITY

## 2024-08-08 RX ORDER — LOPERAMIDE HCL 2 MG
2 TABLET ORAL
Qty: 120 TABLET | Refills: 3 | Status: SHIPPED | OUTPATIENT
Start: 2024-08-08 | End: 2024-09-07

## 2024-08-08 RX ORDER — DIPHENOXYLATE HYDROCHLORIDE AND ATROPINE SULFATE 2.5; .025 MG/1; MG/1
1 TABLET ORAL
Qty: 120 TABLET | Refills: 2 | Status: SHIPPED | OUTPATIENT
Start: 2024-08-08 | End: 2024-09-07

## 2024-08-08 ASSESSMENT — PAIN SCALES - GENERAL: PAINLEVEL: 0-NO PAIN

## 2024-08-13 ENCOUNTER — APPOINTMENT (OUTPATIENT)
Dept: CARDIOLOGY | Facility: HOSPITAL | Age: 69
End: 2024-08-13
Payer: MEDICARE

## 2024-08-14 ENCOUNTER — HOSPITAL ENCOUNTER (OUTPATIENT)
Dept: CARDIOLOGY | Facility: HOSPITAL | Age: 69
Discharge: HOME | End: 2024-08-14
Payer: MEDICARE

## 2024-08-14 DIAGNOSIS — R55 SYNCOPE AND COLLAPSE: ICD-10-CM

## 2024-08-14 DIAGNOSIS — I49.9 CARDIAC ARRHYTHMIA, UNSPECIFIED: ICD-10-CM

## 2024-08-14 DIAGNOSIS — Z95.818 IMPLANTABLE LOOP RECORDER PRESENT: Primary | ICD-10-CM

## 2024-08-14 PROCEDURE — 93298 REM INTERROG DEV EVAL SCRMS: CPT

## 2024-08-16 DIAGNOSIS — K21.9 GASTROESOPHAGEAL REFLUX DISEASE WITHOUT ESOPHAGITIS: ICD-10-CM

## 2024-08-16 DIAGNOSIS — R15.1 FECAL SOILING: ICD-10-CM

## 2024-08-16 RX ORDER — PANTOPRAZOLE SODIUM 40 MG/1
40 TABLET, DELAYED RELEASE ORAL DAILY
Qty: 30 TABLET | Refills: 5 | Status: SHIPPED | OUTPATIENT
Start: 2024-08-16

## 2024-08-21 ENCOUNTER — HOSPITAL ENCOUNTER (OUTPATIENT)
Dept: CARDIOLOGY | Facility: HOSPITAL | Age: 69
Discharge: HOME | End: 2024-08-21
Payer: MEDICARE

## 2024-08-21 DIAGNOSIS — M81.0 OSTEOPOROSIS, UNSPECIFIED OSTEOPOROSIS TYPE, UNSPECIFIED PATHOLOGICAL FRACTURE PRESENCE: Primary | ICD-10-CM

## 2024-08-21 DIAGNOSIS — Z95.818 IMPLANTABLE LOOP RECORDER PRESENT: ICD-10-CM

## 2024-08-21 DIAGNOSIS — R55 SYNCOPE AND COLLAPSE: ICD-10-CM

## 2024-08-21 RX ORDER — ALBUTEROL SULFATE 0.83 MG/ML
3 SOLUTION RESPIRATORY (INHALATION) AS NEEDED
OUTPATIENT
Start: 2024-08-29

## 2024-08-21 RX ORDER — ZOLEDRONIC ACID 5 MG/100ML
5 INJECTION, SOLUTION INTRAVENOUS ONCE
OUTPATIENT
Start: 2024-08-29

## 2024-08-21 RX ORDER — EPINEPHRINE 0.3 MG/.3ML
0.3 INJECTION SUBCUTANEOUS EVERY 5 MIN PRN
OUTPATIENT
Start: 2024-08-29

## 2024-08-21 RX ORDER — FAMOTIDINE 10 MG/ML
20 INJECTION INTRAVENOUS ONCE AS NEEDED
OUTPATIENT
Start: 2024-08-29

## 2024-08-21 RX ORDER — ZOLEDRONIC ACID 5 MG/100ML
5 INJECTION, SOLUTION INTRAVENOUS ONCE
Qty: 100 ML | Refills: 0 | Status: SHIPPED
Start: 2024-08-21 | End: 2024-08-21

## 2024-08-21 RX ORDER — DIPHENHYDRAMINE HYDROCHLORIDE 50 MG/ML
50 INJECTION INTRAMUSCULAR; INTRAVENOUS AS NEEDED
OUTPATIENT
Start: 2024-08-29

## 2024-08-21 RX ORDER — LOPERAMIDE HYDROCHLORIDE 2 MG/1
CAPSULE ORAL
Qty: 80 CAPSULE | Refills: 0 | Status: SHIPPED | OUTPATIENT
Start: 2024-08-21

## 2024-08-21 NOTE — PROGRESS NOTES
Per. Dr. Pierre May fuv - continue reclast - [facility] for 8/29/24.

## 2024-08-29 ENCOUNTER — APPOINTMENT (OUTPATIENT)
Dept: INFUSION THERAPY | Facility: CLINIC | Age: 69
End: 2024-08-29
Payer: MEDICARE

## 2024-08-30 ENCOUNTER — APPOINTMENT (OUTPATIENT)
Dept: UROLOGY | Facility: CLINIC | Age: 69
End: 2024-08-30
Payer: MEDICARE

## 2024-09-03 ENCOUNTER — APPOINTMENT (OUTPATIENT)
Dept: SURGERY | Facility: CLINIC | Age: 69
End: 2024-09-03
Payer: MEDICARE

## 2024-09-03 ENCOUNTER — APPOINTMENT (OUTPATIENT)
Dept: UROLOGY | Facility: CLINIC | Age: 69
End: 2024-09-03
Payer: MEDICARE

## 2024-09-03 ENCOUNTER — APPOINTMENT (OUTPATIENT)
Dept: INFUSION THERAPY | Facility: CLINIC | Age: 69
End: 2024-09-03
Payer: MEDICARE

## 2024-09-11 DIAGNOSIS — R30.0 DYSURIA: ICD-10-CM

## 2024-09-13 ENCOUNTER — LAB (OUTPATIENT)
Dept: LAB | Facility: LAB | Age: 69
End: 2024-09-13
Payer: MEDICARE

## 2024-09-13 DIAGNOSIS — M81.0 OSTEOPOROSIS, UNSPECIFIED OSTEOPOROSIS TYPE, UNSPECIFIED PATHOLOGICAL FRACTURE PRESENCE: ICD-10-CM

## 2024-09-13 DIAGNOSIS — M35.3 PMR (POLYMYALGIA RHEUMATICA) (MULTI): ICD-10-CM

## 2024-09-13 LAB
ALBUMIN SERPL BCP-MCNC: 3.9 G/DL (ref 3.4–5)
ALP SERPL-CCNC: 97 U/L (ref 33–136)
ALT SERPL W P-5'-P-CCNC: 21 U/L (ref 7–45)
ANION GAP SERPL CALC-SCNC: 11 MMOL/L (ref 10–20)
AST SERPL W P-5'-P-CCNC: 18 U/L (ref 9–39)
BILIRUB SERPL-MCNC: 0.4 MG/DL (ref 0–1.2)
BUN SERPL-MCNC: 10 MG/DL (ref 6–23)
CALCIUM SERPL-MCNC: 8.5 MG/DL (ref 8.6–10.3)
CHLORIDE SERPL-SCNC: 108 MMOL/L (ref 98–107)
CO2 SERPL-SCNC: 24 MMOL/L (ref 21–32)
CREAT SERPL-MCNC: 0.79 MG/DL (ref 0.5–1.05)
CRP SERPL-MCNC: 1.32 MG/DL
EGFRCR SERPLBLD CKD-EPI 2021: 81 ML/MIN/1.73M*2
ERYTHROCYTE [SEDIMENTATION RATE] IN BLOOD BY WESTERGREN METHOD: 16 MM/H (ref 0–30)
GLUCOSE SERPL-MCNC: 100 MG/DL (ref 74–99)
POTASSIUM SERPL-SCNC: 4.1 MMOL/L (ref 3.5–5.3)
PROT SERPL-MCNC: 6.7 G/DL (ref 6.4–8.2)
SODIUM SERPL-SCNC: 139 MMOL/L (ref 136–145)

## 2024-09-13 PROCEDURE — 85652 RBC SED RATE AUTOMATED: CPT

## 2024-09-13 PROCEDURE — 86140 C-REACTIVE PROTEIN: CPT

## 2024-09-13 PROCEDURE — 36415 COLL VENOUS BLD VENIPUNCTURE: CPT

## 2024-09-13 PROCEDURE — 80053 COMPREHEN METABOLIC PANEL: CPT

## 2024-09-17 ENCOUNTER — APPOINTMENT (OUTPATIENT)
Dept: INFUSION THERAPY | Facility: CLINIC | Age: 69
End: 2024-09-17
Payer: MEDICARE

## 2024-09-17 VITALS
BODY MASS INDEX: 25.38 KG/M2 | DIASTOLIC BLOOD PRESSURE: 69 MMHG | TEMPERATURE: 96.8 F | WEIGHT: 143.3 LBS | HEART RATE: 67 BPM | RESPIRATION RATE: 16 BRPM | OXYGEN SATURATION: 95 % | SYSTOLIC BLOOD PRESSURE: 102 MMHG

## 2024-09-17 DIAGNOSIS — M81.0 OSTEOPOROSIS, UNSPECIFIED OSTEOPOROSIS TYPE, UNSPECIFIED PATHOLOGICAL FRACTURE PRESENCE: ICD-10-CM

## 2024-09-17 PROCEDURE — 96374 THER/PROPH/DIAG INJ IV PUSH: CPT | Performed by: NURSE PRACTITIONER

## 2024-09-17 RX ORDER — ZOLEDRONIC ACID 5 MG/100ML
5 INJECTION, SOLUTION INTRAVENOUS ONCE
Status: COMPLETED | OUTPATIENT
Start: 2024-09-17 | End: 2024-09-17

## 2024-09-17 RX ORDER — ZOLEDRONIC ACID 5 MG/100ML
5 INJECTION, SOLUTION INTRAVENOUS ONCE
Status: CANCELLED | OUTPATIENT
Start: 2024-09-17

## 2024-09-17 RX ORDER — DIPHENHYDRAMINE HYDROCHLORIDE 50 MG/ML
50 INJECTION INTRAMUSCULAR; INTRAVENOUS AS NEEDED
OUTPATIENT
Start: 2024-09-17

## 2024-09-17 RX ORDER — FAMOTIDINE 10 MG/ML
20 INJECTION INTRAVENOUS ONCE AS NEEDED
OUTPATIENT
Start: 2024-09-17

## 2024-09-17 RX ORDER — ALBUTEROL SULFATE 0.83 MG/ML
3 SOLUTION RESPIRATORY (INHALATION) AS NEEDED
OUTPATIENT
Start: 2024-09-17

## 2024-09-17 RX ORDER — EPINEPHRINE 0.3 MG/.3ML
0.3 INJECTION SUBCUTANEOUS EVERY 5 MIN PRN
OUTPATIENT
Start: 2024-09-17

## 2024-09-17 ASSESSMENT — ENCOUNTER SYMPTOMS
DYSURIA: 0
BACK PAIN: 1
EXTREMITY WEAKNESS: 0
APPETITE CHANGE: 0
DIARRHEA: 0
HEMATURIA: 0
TROUBLE SWALLOWING: 0
HEADACHES: 1
SORE THROAT: 0
NUMBNESS: 0
BLOOD IN STOOL: 0
ARTHRALGIAS: 1
EYE PROBLEMS: 0
FATIGUE: 1
CHILLS: 0
WOUND: 0
CONSTIPATION: 0
FREQUENCY: 0
WHEEZING: 0
FEVER: 0
LEG SWELLING: 0
NERVOUS/ANXIOUS: 1
VOICE CHANGE: 0
ABDOMINAL PAIN: 0
NAUSEA: 0
DIZZINESS: 1
COUGH: 0
SHORTNESS OF BREATH: 0
LIGHT-HEADEDNESS: 0
MYALGIAS: 0
UNEXPECTED WEIGHT CHANGE: 0
PALPITATIONS: 0
VOMITING: 0
DEPRESSION: 1

## 2024-09-17 NOTE — PROGRESS NOTES
[facility]   Infusion Clinic Note   Date: 2024   Name: Sabina Chopra  : 1955   MRN: [id number]          Reason for Visit: Follow-up and OP Infusion (PATIENT IS HERE FOR RECLAST 5 MG INFUSION ONCE EVERY YEAR.)         Today: We administered zoledronic acid.       Visit Type: INFUSION       Ordered By: DR. FARRAR       Accompanied by:Self       Diagnosis: Osteoporosis, unspecified osteoporosis type, unspecified pathological fracture presence        Allergies:   Allergies as of 2024 - Reviewed 2024   Allergen Reaction Noted    Adhesive Unknown and Rash 2023    Cefaclor Unknown 2023    Famotidine Unknown 2023    Hydromorphone Unknown 2023    Metronidazole Unknown 2023          Current Medications has a current medication list which includes the following prescription(s): amphetamine-dextroamphetamine, armodafinil, bupropion xl, cholecalciferol, clonazepam, colestipol, diclofenac sodium, diphenoxylate-atropine, duloxetine, estradiol, ferrous sulfate (325 mg ferrous sulfate), levothyroxine, loperamide, pantoprazole, pregabalin, propranolol, ropinirole, trazodone, trimethoprim, colestipol, fexofenadine, ketorolac, nystatin, nystatin-triamcinolone, and zoledronic acid.       Vitals:   Vitals:    24 1334 24 1435   BP: 119/84 102/69   Pulse: 87 67   Resp: 16 16   Temp: 36.2 °C (97.2 °F) 36 °C (96.8 °F)   TempSrc: Temporal    SpO2: 96% 95%   Weight: 65 kg (143 lb 4.8 oz)              Infusion Pre-procedure Checklist:   - Allergies reviewed: yes   - Medications reviewed: yes       - Previous reaction to current treatment: no      Assess patient for the concerns below. Document provider notification as appropriate.  - Active or recent infection with/without current antibiotic use: no  - Recent or planned invasive dental work: no  - Recent or planned surgeries: no  - Recently received or plans to receive vaccinations: no  - Has  treatment related toxicities: no  - Is pregnant:  n/a      Pain: 6 LUMBAR AREA   - Is the pain different from normal: no   - Is your Doctor aware:  yes       Labs: N/A          Fall Risk Screening: Zaldivar Fall Risk  History of Falling, Immediate or Within 3 Months: Yes (FELL 3 MONTHS AGO. NO INJURIES.)  Secondary Diagnosis: No  Ambulatory Aid: Walks without aid/bedrest/nurse assist  Intravenous Therapy/Heparin Lock: Yes  Gait/Transferring: Normal/bedrest/immobile  Mental Status: Oriented to own ability  Zaldivar Fall Risk Score: 45       Review Of Systems:  Review of Systems   Constitutional:  Positive for fatigue. Negative for appetite change, chills, fever and unexpected weight change.   HENT:   Negative for hearing loss, mouth sores, sore throat, tinnitus, trouble swallowing and voice change.    Eyes:  Negative for eye problems.   Respiratory:  Negative for cough, shortness of breath and wheezing.    Cardiovascular:  Negative for chest pain, leg swelling and palpitations.   Gastrointestinal:  Negative for abdominal pain, blood in stool, constipation, diarrhea, nausea and vomiting.   Genitourinary:  Negative for dysuria, frequency and hematuria.    Musculoskeletal:  Positive for arthralgias and back pain. Negative for myalgias.        BACK PAIN, GENERALIZED JOINT PAIN. HAS ARTHRITIS   Skin:  Negative for itching, rash and wound.   Neurological:  Positive for dizziness and headaches. Negative for extremity weakness, light-headedness and numbness.        OCCASIONAL HEADACHES, DIZZINESS.  TINGLING IN ARMS OCCASIONALLY R/T PINCHED NERVE IN NECK   Psychiatric/Behavioral:  Positive for depression. The patient is nervous/anxious.         MANAGED ON MEDS FOR BOTH ANXIETY AND DEPRESSION.         ROS completed? Yes      Infusion Readiness:  - Assessment Concerns Related to Infusion: No  - Provider notified: n/a      Document Below Only If Indicated:   New Patient Education:    N/A (returning patient for continuation of therapy.  "Ongoing education provided as needed.)        Treatment Conditions & Drug Specific Questions:    Zoledronic Acid  (RECLAST)    (Unless otherwise specified on patient specific therapy plan):     TREATMENT CONDITIONS:  Unless otherwise specified on patient specific therapy plan HOLD and notify provider prior to proceeding with treatment if:   o Creatinine clearance LESS THAN 35 mL/Minute  o Corrected serum calcium LESS THAN 8.6 mg/dL   OR   Ionized calcium LESS THAN 1.1 mmol  o Recent (within 4 weeks) or planned invasive dental procedure.  -           Positive Pregnancy     Lab Results   Component Value Date    CREATININE 0.79 09/13/2024      Lab Results   Component Value Date    CALCIUM 8.5 (L) 09/13/2024      No results found for: \"CAION\"    CrCl: 68.804  Corrected calcium: 8.58    Labs reviewed and patient meets treatment conditions? Yes  **[facility] CNP CONFIRMED MESSAGED RHEUMATOLOGY REGARDING PATIENT'S CALCIUM AND CORRECTED CALCIUM BEING SLIGHTLY OUT OF RANGE. RHEUMATOLOGY CONTACTED DR. FARRAR. DR. FARRAR STATED OK TO PROCEED WITH RECLAST INFUSION TODAY.    DRUG SPECIFIC QUESTIONS:  Is the patient taking a Calcium and Vitamin D supplement?  Yes TAKES VITAMIN D BUT NOT CALCIUM. PATIENT WILL DISCUSS CALCIUM SUPPLEMENT WITH DR. FARRAR.  (Recommended)    Is the patient receiving Zometa or do they have an allergy to Zometa?  No    Is the patient aware of the adverse effects which may include bone fractures, hypocalcemia, influenza-like illness, musculoskeletal pain, ocular inflammation, and osteonecrosis of the jaw? Yes      REMINDERS:  PREGNANCY CATEGORY X DRUG. OBTAIN NEGATIVE PREGNANCY TEST PRIOR TO FIRST INFUSION FOR WOMEN OF CHILDBEARING ABILITY     Recommended Vitals/Observation:  Monitor vital signs before infusion, at the end of the infusion and as needed        Weight Based Drug Calculations:    WEIGHT BASED DRUGS: NOT APPLICABLE / FLAT DOSE          Initiated By: Rabia Watts RN        "

## 2024-09-17 NOTE — PATIENT INSTRUCTIONS
Today :We administered zoledronic acid.     For:   1. Osteoporosis, unspecified osteoporosis type, unspecified pathological fracture presence         Your next appointment is due in:  ONE YEAR.      Please read the  Medication Guide that was given to you and reviewed during todays visit.     (Tell all doctors including dentists that you are taking this medication)     Go to the emergency room or call 911 if:  -You have signs of allergic reaction:   -Rash, hives, itching.   -Swollen, blistered, peeling skin.   -Swelling of face, lips, mouth, tongue or throat.   -Tightness of chest, trouble breathing, swallowing or talking     Call your doctor:  - If IV / injection site gets red, warm, swollen, itchy or leaks fluid or pus.     (Leave dressing on your IV site for at least 2 hours and keep area clean and dry  - If you get sick or have symptoms of infection or are not feeling well for any reason.    (Wash your hands often, stay away from people who are sick)  - If you have side effects from your medication that do not go away or are bothersome.     (Refer to the teaching your nurse gave you for side effects to call your doctor about)    - Common side effects may include:  stuffy nose, headache, feeling tired, muscle aches, upset stomach  - Before receiving any vaccines     - Call the [facility] at   If:  - You get sick, are on antibiotics, have had a recent vaccine, have surgery or dental work and your doctor wants your visit rescheduled.  - You need to cancel and reschedule your visit for any reason. Call at least 2 days before your visit if you need to cancel.   - Your insurance changes before your next visit.    (We will need to get approval from your new insurance. This can take up to two weeks.)     The [facility] is opened Monday thru Friday. We are closed on weekends and holidays.   Voice mail will take your call if the center is closed. If you leave a message please allow 24  hours for a call back during weekdays. If you leave a message on a weekend/holiday, we will call you back the next business day.

## 2024-09-18 ENCOUNTER — APPOINTMENT (OUTPATIENT)
Dept: PHYSICAL THERAPY | Facility: CLINIC | Age: 69
End: 2024-09-18
Payer: MEDICARE

## 2024-09-25 DIAGNOSIS — R15.1 FECAL SOILING: ICD-10-CM

## 2024-09-26 RX ORDER — LOPERAMIDE HYDROCHLORIDE 2 MG/1
CAPSULE ORAL
Qty: 80 CAPSULE | Refills: 0 | Status: SHIPPED | OUTPATIENT
Start: 2024-09-26

## 2024-10-09 ENCOUNTER — TELEPHONE (OUTPATIENT)
Dept: RHEUMATOLOGY | Facility: CLINIC | Age: 69
End: 2024-10-09
Payer: MEDICARE

## 2024-10-09 NOTE — TELEPHONE ENCOUNTER
Spoke with patient regarding back pain. States Tylenol and heat are not helping with the pain. She would like guidance from Dr. Pierre on what step to take next to alleviate pain.

## 2024-10-10 DIAGNOSIS — M54.50 LUMBAR BACK PAIN: ICD-10-CM

## 2024-10-10 DIAGNOSIS — M35.3 POLYMYALGIA RHEUMATICA (MULTI): ICD-10-CM

## 2024-10-11 RX ORDER — METHYLPREDNISOLONE 4 MG/1
TABLET ORAL
Qty: 21 TABLET | Refills: 0 | Status: SHIPPED | OUTPATIENT
Start: 2024-10-11 | End: 2024-10-17

## 2024-10-11 NOTE — TELEPHONE ENCOUNTER
Return call placed to patient to make aware of updated orders placed by provider to include x-ray, medrol taper dose and referral placed to ortho-spine. This nurse was unable to establish direct contact and generic voicemail left. This nurse to send over My Chart message to make aware as we approach the weekend and so patient can be made aware of new script sent.

## 2024-10-18 ENCOUNTER — APPOINTMENT (OUTPATIENT)
Dept: UROLOGY | Facility: CLINIC | Age: 69
End: 2024-10-18
Payer: MEDICARE

## 2024-11-01 ENCOUNTER — HOSPITAL ENCOUNTER (OUTPATIENT)
Dept: RADIOLOGY | Facility: CLINIC | Age: 69
Discharge: HOME | End: 2024-11-01
Payer: MEDICARE

## 2024-11-01 ENCOUNTER — APPOINTMENT (OUTPATIENT)
Dept: RHEUMATOLOGY | Facility: CLINIC | Age: 69
End: 2024-11-01
Payer: MEDICARE

## 2024-11-01 ENCOUNTER — OFFICE VISIT (OUTPATIENT)
Facility: HOSPITAL | Age: 69
End: 2024-11-01
Payer: MEDICARE

## 2024-11-01 VITALS
OXYGEN SATURATION: 98 % | BODY MASS INDEX: 26.29 KG/M2 | HEIGHT: 63 IN | TEMPERATURE: 96.6 F | WEIGHT: 148.4 LBS | SYSTOLIC BLOOD PRESSURE: 124 MMHG | DIASTOLIC BLOOD PRESSURE: 86 MMHG | HEART RATE: 89 BPM

## 2024-11-01 VITALS — DIASTOLIC BLOOD PRESSURE: 86 MMHG | SYSTOLIC BLOOD PRESSURE: 123 MMHG

## 2024-11-01 DIAGNOSIS — M54.42 CHRONIC BILATERAL LOW BACK PAIN WITH BILATERAL SCIATICA: ICD-10-CM

## 2024-11-01 DIAGNOSIS — R15.9 INCONTINENCE OF FECES, UNSPECIFIED FECAL INCONTINENCE TYPE: ICD-10-CM

## 2024-11-01 DIAGNOSIS — N39.0 RECURRENT UTI: ICD-10-CM

## 2024-11-01 DIAGNOSIS — G89.29 CHRONIC BILATERAL LOW BACK PAIN WITH BILATERAL SCIATICA: ICD-10-CM

## 2024-11-01 DIAGNOSIS — N32.81 OAB (OVERACTIVE BLADDER): Primary | ICD-10-CM

## 2024-11-01 DIAGNOSIS — M81.0 AGE-RELATED OSTEOPOROSIS WITHOUT CURRENT PATHOLOGICAL FRACTURE: ICD-10-CM

## 2024-11-01 DIAGNOSIS — M81.0 AGE-RELATED OSTEOPOROSIS WITHOUT CURRENT PATHOLOGICAL FRACTURE: Primary | ICD-10-CM

## 2024-11-01 DIAGNOSIS — M54.41 CHRONIC BILATERAL LOW BACK PAIN WITH BILATERAL SCIATICA: ICD-10-CM

## 2024-11-01 PROCEDURE — 72110 X-RAY EXAM L-2 SPINE 4/>VWS: CPT

## 2024-11-01 PROCEDURE — 1160F RVW MEDS BY RX/DR IN RCRD: CPT | Performed by: INTERNAL MEDICINE

## 2024-11-01 PROCEDURE — 1159F MED LIST DOCD IN RCRD: CPT | Performed by: INTERNAL MEDICINE

## 2024-11-01 PROCEDURE — 99214 OFFICE O/P EST MOD 30 MIN: CPT | Performed by: INTERNAL MEDICINE

## 2024-11-01 PROCEDURE — 3008F BODY MASS INDEX DOCD: CPT | Performed by: INTERNAL MEDICINE

## 2024-11-01 PROCEDURE — 1159F MED LIST DOCD IN RCRD: CPT | Performed by: STUDENT IN AN ORGANIZED HEALTH CARE EDUCATION/TRAINING PROGRAM

## 2024-11-01 PROCEDURE — 99214 OFFICE O/P EST MOD 30 MIN: CPT | Performed by: STUDENT IN AN ORGANIZED HEALTH CARE EDUCATION/TRAINING PROGRAM

## 2024-11-01 PROCEDURE — 1125F AMNT PAIN NOTED PAIN PRSNT: CPT | Performed by: INTERNAL MEDICINE

## 2024-11-01 ASSESSMENT — COLUMBIA-SUICIDE SEVERITY RATING SCALE - C-SSRS
2. HAVE YOU ACTUALLY HAD ANY THOUGHTS OF KILLING YOURSELF?: NO
1. IN THE PAST MONTH, HAVE YOU WISHED YOU WERE DEAD OR WISHED YOU COULD GO TO SLEEP AND NOT WAKE UP?: NO
6. HAVE YOU EVER DONE ANYTHING, STARTED TO DO ANYTHING, OR PREPARED TO DO ANYTHING TO END YOUR LIFE?: NO

## 2024-11-01 ASSESSMENT — ENCOUNTER SYMPTOMS
OCCASIONAL FEELINGS OF UNSTEADINESS: 0
LOSS OF SENSATION IN FEET: 0
DEPRESSION: 0

## 2024-11-01 ASSESSMENT — PAIN SCALES - GENERAL: PAINLEVEL_OUTOF10: 4

## 2024-11-14 ENCOUNTER — HOSPITAL ENCOUNTER (OUTPATIENT)
Dept: CARDIOLOGY | Facility: HOSPITAL | Age: 69
Discharge: HOME | End: 2024-11-14
Payer: MEDICARE

## 2024-11-14 DIAGNOSIS — R55 SYNCOPE AND COLLAPSE: ICD-10-CM

## 2024-11-14 DIAGNOSIS — I49.9 CARDIAC ARRHYTHMIA, UNSPECIFIED CARDIAC ARRHYTHMIA TYPE: Primary | ICD-10-CM

## 2024-11-14 DIAGNOSIS — Z95.818 IMPLANTABLE LOOP RECORDER PRESENT: ICD-10-CM

## 2024-11-14 PROCEDURE — 93298 REM INTERROG DEV EVAL SCRMS: CPT

## 2024-11-19 ENCOUNTER — APPOINTMENT (OUTPATIENT)
Dept: CARDIOLOGY | Facility: HOSPITAL | Age: 69
End: 2024-11-19
Payer: MEDICARE

## 2024-12-24 ENCOUNTER — HOSPITAL ENCOUNTER (OUTPATIENT)
Dept: CARDIOLOGY | Facility: HOSPITAL | Age: 69
Discharge: HOME | End: 2024-12-24
Payer: MEDICARE

## 2024-12-24 DIAGNOSIS — R55 SYNCOPE AND COLLAPSE: ICD-10-CM

## 2024-12-24 DIAGNOSIS — I49.9 CARDIAC ARRHYTHMIA, UNSPECIFIED: ICD-10-CM

## 2024-12-30 DIAGNOSIS — R10.9 ABDOMINAL PAIN, UNSPECIFIED ABDOMINAL LOCATION: Primary | ICD-10-CM

## 2024-12-30 DIAGNOSIS — R10.9 FLANK PAIN: ICD-10-CM

## 2024-12-31 ENCOUNTER — HOSPITAL ENCOUNTER (OUTPATIENT)
Dept: RADIOLOGY | Facility: HOSPITAL | Age: 69
Discharge: HOME | End: 2024-12-31
Payer: MEDICARE

## 2024-12-31 DIAGNOSIS — R10.9 FLANK PAIN: ICD-10-CM

## 2024-12-31 DIAGNOSIS — R10.9 ABDOMINAL PAIN, UNSPECIFIED ABDOMINAL LOCATION: ICD-10-CM

## 2024-12-31 PROCEDURE — 74176 CT ABD & PELVIS W/O CONTRAST: CPT

## 2025-01-29 DIAGNOSIS — N32.81 OAB (OVERACTIVE BLADDER): ICD-10-CM

## 2025-01-29 DIAGNOSIS — M81.0 AGE-RELATED OSTEOPOROSIS WITHOUT CURRENT PATHOLOGICAL FRACTURE: Primary | ICD-10-CM

## 2025-01-29 DIAGNOSIS — R15.1 FECAL SOILING: ICD-10-CM

## 2025-01-29 RX ORDER — AMPICILLIN TRIHYDRATE 500 MG
25 CAPSULE ORAL DAILY
Qty: 30 CAPSULE | Refills: 0 | Status: SHIPPED | OUTPATIENT
Start: 2025-01-29

## 2025-01-29 RX ORDER — TRIMETHOPRIM 100 MG/1
100 TABLET ORAL DAILY
Qty: 90 TABLET | Refills: 0 | Status: SHIPPED | OUTPATIENT
Start: 2025-01-29

## 2025-01-29 RX ORDER — LOPERAMIDE HYDROCHLORIDE 2 MG/1
CAPSULE ORAL
Qty: 80 CAPSULE | Refills: 0 | Status: SHIPPED | OUTPATIENT
Start: 2025-01-29

## 2025-02-04 ENCOUNTER — APPOINTMENT (OUTPATIENT)
Dept: RHEUMATOLOGY | Facility: CLINIC | Age: 70
End: 2025-02-04
Payer: MEDICARE

## 2025-02-10 NOTE — PROGRESS NOTES
HISTORY OF PRESENT ILLNESS:  Sabina Chopra is a 69 y.o. female who presents today for a follow up visit. She is trying to lose weight. She is still having her fecal incontinence.  Fewer occurrences but still happens, especially when she sleeps.          Past Medical History  She has a past medical history of Compression fracture of L2 lumbar vertebra (Multi) (02/07/2023), COVID-19 (02/07/2023), Hypersomnia, unspecified, Personal history of other diseases of the digestive system, Personal history of other diseases of the musculoskeletal system and connective tissue, Personal history of other diseases of the musculoskeletal system and connective tissue, Personal history of other diseases of the nervous system and sense organs, Personal history of other endocrine, nutritional and metabolic disease, Personal history of other mental and behavioral disorders, Personal history of other mental and behavioral disorders, Personal history of other specified conditions, Personal history of other specified conditions, and Syncope and collapse (09/04/2019).    Surgical History  She has a past surgical history that includes Other surgical history (11/19/2018); Other surgical history (11/19/2018); Other surgical history (11/19/2018); and Other surgical history (11/14/2018).     Social History  She reports that she has been smoking cigarettes. She has been exposed to tobacco smoke. She has never used smokeless tobacco. She reports that she does not drink alcohol and does not use drugs.    Family History  Family History   Problem Relation Name Age of Onset    Diabetes Sister      Breast cancer Other grandmother         Allergies  Adhesive, Cefaclor, Famotidine, Hydromorphone, and Metronidazole      A comprehensive 10+ review of systems was negative except for: see hpi                          PHYSICAL EXAMINATION:  BP Readings from Last 3 Encounters:   11/01/24 124/86   11/01/24 123/86   09/17/24 102/69      Wt Readings from Last 3  "Encounters:   11/01/24 67.3 kg (148 lb 6.4 oz)   09/17/24 65 kg (143 lb 4.8 oz)   08/08/24 65.8 kg (145 lb)      BMI: Estimated body mass index is 26.29 kg/m² as calculated from the following:    Height as of 11/1/24: 1.6 m (5' 3\").    Weight as of 11/1/24: 67.3 kg (148 lb 6.4 oz).  BSA: Estimated body surface area is 1.73 meters squared as calculated from the following:    Height as of 11/1/24: 1.6 m (5' 3\").    Weight as of 11/1/24: 67.3 kg (148 lb 6.4 oz).  HEENT: Normocephalic, atraumatic, PER EOMI, nonicteric, trachea normal, thyroid normal, oropharynx normal.  CARDIAC: regular rate & rhythm, S1 & S2 normal.  No heaves, thrills, gallops or murmurs.  LUNGS: Clear to auscultation, no spinal or CV tenderness.  EXTREMITIES: No evidence of cyanosis, clubbing or edema.               Assessment:  69-year-old with fecal incontinence occurring status post subtotal colectomy with ileorectal anastomosis in 2007 for chronic constipation, recurrent UTI, urinary urge incontinence       FI:  moderate improvement with colestipol 1 mg bid and metamucil  increase to 2 mg bid, on loperamide and lomotil  Negligible improvement with SNM     Continue Colestipol 2 mg bid   Tried anal pessary without much improvement  Increased immodium and lomotil  Saw Dr. Lo, no rectal prolapse    Stool more solid, wants to try anal bulking    Can also consider colesevelam PO or liraglutide-1     She is going to see about getting into weight management      Shaniqua:  Completed daily antibiotics.  continue vaginal estrogen  no d-mannose, as may worsen FI, could consider methenamine if needed        UUI:  no improvement with oxybutynin  Failed gemtesa  Improved significantly with [employer] device      Follow up in 4 weeks        All questions and concerns were answered and addressed.  The patient expressed understanding and agrees with the plan.     Osmel Santillan MD    Scribe Attestation  By signing my name below, I, Olyakathi Martinez, Scribe   attest " that this documentation has been prepared under the direction and in the presence of Osmel Santillan MD.

## 2025-02-11 ENCOUNTER — OFFICE VISIT (OUTPATIENT)
Facility: HOSPITAL | Age: 70
End: 2025-02-11
Payer: MEDICARE

## 2025-02-11 VITALS — DIASTOLIC BLOOD PRESSURE: 87 MMHG | SYSTOLIC BLOOD PRESSURE: 130 MMHG

## 2025-02-11 DIAGNOSIS — N32.81 OAB (OVERACTIVE BLADDER): Primary | ICD-10-CM

## 2025-02-11 DIAGNOSIS — R15.9 INCONTINENCE OF FECES, UNSPECIFIED FECAL INCONTINENCE TYPE: ICD-10-CM

## 2025-02-11 DIAGNOSIS — N39.3 SUI (STRESS URINARY INCONTINENCE, FEMALE): ICD-10-CM

## 2025-02-11 PROCEDURE — G2211 COMPLEX E/M VISIT ADD ON: HCPCS | Performed by: STUDENT IN AN ORGANIZED HEALTH CARE EDUCATION/TRAINING PROGRAM

## 2025-02-11 PROCEDURE — 99214 OFFICE O/P EST MOD 30 MIN: CPT | Performed by: STUDENT IN AN ORGANIZED HEALTH CARE EDUCATION/TRAINING PROGRAM

## 2025-02-11 PROCEDURE — 1159F MED LIST DOCD IN RCRD: CPT | Performed by: STUDENT IN AN ORGANIZED HEALTH CARE EDUCATION/TRAINING PROGRAM

## 2025-02-12 LAB
CRP SERPL-MCNC: 40.8 MG/L
ERYTHROCYTE [SEDIMENTATION RATE] IN BLOOD BY WESTERGREN METHOD: 36 MM/H

## 2025-02-14 ENCOUNTER — OFFICE VISIT (OUTPATIENT)
Dept: PRIMARY CARE | Facility: CLINIC | Age: 70
End: 2025-02-14
Payer: MEDICARE

## 2025-02-14 VITALS
HEIGHT: 63 IN | SYSTOLIC BLOOD PRESSURE: 120 MMHG | BODY MASS INDEX: 26.58 KG/M2 | DIASTOLIC BLOOD PRESSURE: 80 MMHG | WEIGHT: 150 LBS

## 2025-02-14 DIAGNOSIS — Z72.0 TOBACCO ABUSE: ICD-10-CM

## 2025-02-14 DIAGNOSIS — E03.9 ACQUIRED HYPOTHYROIDISM: ICD-10-CM

## 2025-02-14 DIAGNOSIS — M35.3 POLYMYALGIA RHEUMATICA (MULTI): ICD-10-CM

## 2025-02-14 DIAGNOSIS — Z87.891 PERSONAL HISTORY OF NICOTINE DEPENDENCE: ICD-10-CM

## 2025-02-14 DIAGNOSIS — F33.9 MAJOR DEPRESSION, RECURRENT, CHRONIC (CMS-HCC): ICD-10-CM

## 2025-02-14 DIAGNOSIS — K52.9 CHRONIC DIARRHEA: ICD-10-CM

## 2025-02-14 DIAGNOSIS — F41.1 GENERALIZED ANXIETY DISORDER: ICD-10-CM

## 2025-02-14 DIAGNOSIS — Z76.89 ENCOUNTER TO ESTABLISH CARE: ICD-10-CM

## 2025-02-14 DIAGNOSIS — M81.0 AGE-RELATED OSTEOPOROSIS WITHOUT CURRENT PATHOLOGICAL FRACTURE: ICD-10-CM

## 2025-02-14 DIAGNOSIS — G47.9 SLEEP DISORDER: ICD-10-CM

## 2025-02-14 DIAGNOSIS — R73.9 HYPERGLYCEMIA: Primary | ICD-10-CM

## 2025-02-14 DIAGNOSIS — Z23 NEED FOR INFLUENZA VACCINATION: ICD-10-CM

## 2025-02-14 DIAGNOSIS — R63.5 WEIGHT GAIN: ICD-10-CM

## 2025-02-14 DIAGNOSIS — Z95.818 STATUS POST PLACEMENT OF IMPLANTABLE LOOP RECORDER: ICD-10-CM

## 2025-02-14 DIAGNOSIS — K21.9 GASTROESOPHAGEAL REFLUX DISEASE WITHOUT ESOPHAGITIS: ICD-10-CM

## 2025-02-14 DIAGNOSIS — F90.9 ATTENTION DEFICIT HYPERACTIVITY DISORDER (ADHD), UNSPECIFIED ADHD TYPE: ICD-10-CM

## 2025-02-14 DIAGNOSIS — Z12.31 SCREENING MAMMOGRAM, ENCOUNTER FOR: ICD-10-CM

## 2025-02-14 DIAGNOSIS — R53.83 OTHER FATIGUE: ICD-10-CM

## 2025-02-14 DIAGNOSIS — Z12.2 ENCOUNTER FOR SCREENING FOR LUNG CANCER: ICD-10-CM

## 2025-02-14 PROBLEM — L50.8 ACUTE URTICARIA: Status: RESOLVED | Noted: 2023-02-07 | Resolved: 2025-02-14

## 2025-02-14 PROBLEM — M02.30 REACTIVE ARTHRITIS (MULTI): Status: RESOLVED | Noted: 2018-04-26 | Resolved: 2025-02-14

## 2025-02-14 RX ORDER — DEXTROAMPHETAMINE SACCHARATE, AMPHETAMINE ASPARTATE, DEXTROAMPHETAMINE SULFATE AND AMPHETAMINE SULFATE 3.75; 3.75; 3.75; 3.75 MG/1; MG/1; MG/1; MG/1
TABLET ORAL
COMMUNITY
Start: 2025-01-29

## 2025-02-14 RX ORDER — PREGABALIN 50 MG/1
CAPSULE ORAL
COMMUNITY
Start: 2025-02-09

## 2025-02-14 RX ORDER — DEXTROAMPHETAMINE SACCHARATE, AMPHETAMINE ASPARTATE MONOHYDRATE, DEXTROAMPHETAMINE SULFATE AND AMPHETAMINE SULFATE 6.25; 6.25; 6.25; 6.25 MG/1; MG/1; MG/1; MG/1
CAPSULE, EXTENDED RELEASE ORAL
COMMUNITY
Start: 2025-01-29

## 2025-02-14 RX ORDER — NEOMYCIN SULFATE, POLYMYXIN B SULFATE, AND DEXAMETHASONE 3.5; 10000; 1 MG/G; [USP'U]/G; MG/G
OINTMENT OPHTHALMIC
COMMUNITY
Start: 2024-02-19 | End: 2025-02-14 | Stop reason: ALTCHOICE

## 2025-02-14 ASSESSMENT — PATIENT HEALTH QUESTIONNAIRE - PHQ9
SUM OF ALL RESPONSES TO PHQ QUESTIONS 1-9: 9
6. FEELING BAD ABOUT YOURSELF - OR THAT YOU ARE A FAILURE OR HAVE LET YOURSELF OR YOUR FAMILY DOWN: SEVERAL DAYS
4. FEELING TIRED OR HAVING LITTLE ENERGY: MORE THAN HALF THE DAYS
8. MOVING OR SPEAKING SO SLOWLY THAT OTHER PEOPLE COULD HAVE NOTICED. OR THE OPPOSITE, BEING SO FIGETY OR RESTLESS THAT YOU HAVE BEEN MOVING AROUND A LOT MORE THAN USUAL: NOT AT ALL
1. LITTLE INTEREST OR PLEASURE IN DOING THINGS: MORE THAN HALF THE DAYS
5. POOR APPETITE OR OVEREATING: NOT AT ALL
10. IF YOU CHECKED OFF ANY PROBLEMS, HOW DIFFICULT HAVE THESE PROBLEMS MADE IT FOR YOU TO DO YOUR WORK, TAKE CARE OF THINGS AT HOME, OR GET ALONG WITH OTHER PEOPLE: SOMEWHAT DIFFICULT
7. TROUBLE CONCENTRATING ON THINGS, SUCH AS READING THE NEWSPAPER OR WATCHING TELEVISION: NOT AT ALL
2. FEELING DOWN, DEPRESSED OR HOPELESS: MORE THAN HALF THE DAYS
9. THOUGHTS THAT YOU WOULD BE BETTER OFF DEAD, OR OF HURTING YOURSELF: NOT AT ALL
SUM OF ALL RESPONSES TO PHQ9 QUESTIONS 1 AND 2: 4
3. TROUBLE FALLING OR STAYING ASLEEP OR SLEEPING TOO MUCH: MORE THAN HALF THE DAYS

## 2025-02-14 ASSESSMENT — ENCOUNTER SYMPTOMS: OCCASIONAL FEELINGS OF UNSTEADINESS: 0

## 2025-02-14 NOTE — PATIENT INSTRUCTIONS

## 2025-02-15 LAB
25(OH)D3+25(OH)D2 SERPL-MCNC: 26 NG/ML (ref 30–100)
ALBUMIN SERPL-MCNC: 4 G/DL (ref 3.6–5.1)
ALP SERPL-CCNC: 75 U/L (ref 37–153)
ALT SERPL-CCNC: 14 U/L (ref 6–29)
ANION GAP SERPL CALCULATED.4IONS-SCNC: 8 MMOL/L (CALC) (ref 7–17)
AST SERPL-CCNC: 17 U/L (ref 10–35)
BILIRUB SERPL-MCNC: 0.3 MG/DL (ref 0.2–1.2)
BUN SERPL-MCNC: 14 MG/DL (ref 7–25)
CALCIUM SERPL-MCNC: 9.4 MG/DL (ref 8.6–10.4)
CHLORIDE SERPL-SCNC: 109 MMOL/L (ref 98–110)
CHOLEST SERPL-MCNC: 223 MG/DL
CHOLEST/HDLC SERPL: 5.9 (CALC)
CO2 SERPL-SCNC: 27 MMOL/L (ref 20–32)
CREAT SERPL-MCNC: 0.84 MG/DL (ref 0.5–1.05)
EGFRCR SERPLBLD CKD-EPI 2021: 75 ML/MIN/1.73M2
ERYTHROCYTE [DISTWIDTH] IN BLOOD BY AUTOMATED COUNT: 13.1 % (ref 11–15)
EST. AVERAGE GLUCOSE BLD GHB EST-MCNC: 120 MG/DL
EST. AVERAGE GLUCOSE BLD GHB EST-SCNC: 6.6 MMOL/L
GLUCOSE SERPL-MCNC: 106 MG/DL (ref 65–139)
HBA1C MFR BLD: 5.8 % OF TOTAL HGB
HCT VFR BLD AUTO: 41.4 % (ref 35–45)
HDLC SERPL-MCNC: 38 MG/DL
HGB BLD-MCNC: 13.1 G/DL (ref 11.7–15.5)
LDLC SERPL CALC-MCNC: 148 MG/DL (CALC)
MCH RBC QN AUTO: 31.6 PG (ref 27–33)
MCHC RBC AUTO-ENTMCNC: 31.6 G/DL (ref 32–36)
MCV RBC AUTO: 100 FL (ref 80–100)
NONHDLC SERPL-MCNC: 185 MG/DL (CALC)
PLATELET # BLD AUTO: 301 THOUSAND/UL (ref 140–400)
PMV BLD REES-ECKER: 11.3 FL (ref 7.5–12.5)
POTASSIUM SERPL-SCNC: 4.9 MMOL/L (ref 3.5–5.3)
PROT SERPL-MCNC: 6.4 G/DL (ref 6.1–8.1)
RBC # BLD AUTO: 4.14 MILLION/UL (ref 3.8–5.1)
SODIUM SERPL-SCNC: 144 MMOL/L (ref 135–146)
TRIGL SERPL-MCNC: 231 MG/DL
TSH SERPL-ACNC: 1.57 MIU/L (ref 0.4–4.5)
VIT B12 SERPL-MCNC: 459 PG/ML (ref 200–1100)
WBC # BLD AUTO: 8.2 THOUSAND/UL (ref 3.8–10.8)

## 2025-02-19 ENCOUNTER — APPOINTMENT (OUTPATIENT)
Dept: CARDIOLOGY | Facility: HOSPITAL | Age: 70
End: 2025-02-19
Payer: MEDICARE

## 2025-02-25 ENCOUNTER — HOSPITAL ENCOUNTER (OUTPATIENT)
Dept: CARDIOLOGY | Facility: HOSPITAL | Age: 70
Discharge: HOME | End: 2025-02-25
Payer: MEDICARE

## 2025-02-25 DIAGNOSIS — Z95.818 IMPLANTABLE LOOP RECORDER PRESENT: ICD-10-CM

## 2025-02-25 DIAGNOSIS — R55 SYNCOPE AND COLLAPSE: ICD-10-CM

## 2025-02-25 PROCEDURE — 93298 REM INTERROG DEV EVAL SCRMS: CPT | Performed by: STUDENT IN AN ORGANIZED HEALTH CARE EDUCATION/TRAINING PROGRAM

## 2025-02-25 PROCEDURE — 93298 REM INTERROG DEV EVAL SCRMS: CPT

## 2025-03-06 ENCOUNTER — APPOINTMENT (OUTPATIENT)
Dept: ALLERGY | Facility: CLINIC | Age: 70
End: 2025-03-06
Payer: MEDICARE

## 2025-03-10 DIAGNOSIS — R15.1 FECAL SOILING: ICD-10-CM

## 2025-03-10 RX ORDER — LOPERAMIDE HYDROCHLORIDE 2 MG/1
CAPSULE ORAL
Qty: 80 CAPSULE | Refills: 0 | Status: SHIPPED | OUTPATIENT
Start: 2025-03-10

## 2025-03-12 ENCOUNTER — HOSPITAL ENCOUNTER (OUTPATIENT)
Dept: RADIOLOGY | Facility: CLINIC | Age: 70
Discharge: HOME | End: 2025-03-12
Payer: MEDICARE

## 2025-03-12 DIAGNOSIS — Z87.891 PERSONAL HISTORY OF NICOTINE DEPENDENCE: ICD-10-CM

## 2025-03-12 DIAGNOSIS — Z12.2 ENCOUNTER FOR SCREENING FOR LUNG CANCER: ICD-10-CM

## 2025-03-12 DIAGNOSIS — Z12.31 SCREENING MAMMOGRAM, ENCOUNTER FOR: ICD-10-CM

## 2025-03-12 DIAGNOSIS — Z72.0 TOBACCO ABUSE: ICD-10-CM

## 2025-03-12 PROCEDURE — 71271 CT THORAX LUNG CANCER SCR C-: CPT

## 2025-03-12 PROCEDURE — 77063 BREAST TOMOSYNTHESIS BI: CPT

## 2025-03-12 PROCEDURE — 71271 CT THORAX LUNG CANCER SCR C-: CPT | Performed by: RADIOLOGY

## 2025-03-12 PROCEDURE — 77063 BREAST TOMOSYNTHESIS BI: CPT | Performed by: RADIOLOGY

## 2025-03-12 PROCEDURE — 77067 SCR MAMMO BI INCL CAD: CPT | Performed by: RADIOLOGY

## 2025-03-13 DIAGNOSIS — R94.2 ABNORMAL LUNG SCAN: Primary | ICD-10-CM

## 2025-03-17 ENCOUNTER — APPOINTMENT (OUTPATIENT)
Dept: PRIMARY CARE | Facility: CLINIC | Age: 70
End: 2025-03-17
Payer: MEDICARE

## 2025-03-17 ASSESSMENT — ENCOUNTER SYMPTOMS
WEIGHT LOSS: 0
ARTHRALGIAS: 1
CONSTIPATION: 0
DYSURIA: 0
FEVER: 0
HEMATURIA: 0
HEMATOCHEZIA: 0
BELCHING: 1
ANOREXIA: 1
DIARRHEA: 1
NAUSEA: 0
VOMITING: 0
FLATUS: 0
HEADACHES: 1
FREQUENCY: 0
MYALGIAS: 1
ABDOMINAL PAIN: 1

## 2025-03-17 NOTE — PROGRESS NOTES
Virtual or Telephone Consent    While technically available, the patient was unable or unwilling to consent to connect via audio/video telehealth technology; therefore, I performed this visit using a real-time audio only connection between Sabina Chopra & Osmel Santillan MD.  Verbal consent was requested and obtained from Sabina Chopra on this date, 03/19/25 for a telehealth visit and the patient's location was confirmed at the time of the visit.    HISTORY OF PRESENT ILLNESS:  Sabina Chopra is a 69 y.o. female who presents today for a virtual follow up visit. She reports that she gets constipation if she takes 5 and is wondering if she can do 3 instead and in the pill form.          Past Medical History  She has a past medical history of ADHD (attention deficit hyperactivity disorder) (Unknown), Allergic (Unknown), Anemia (Unknown), Anxiety (Unknown), Arthritis, Cataract (Unknown), Chronic pain disorder (Unknown), Compression fracture of L2 lumbar vertebra (Multi) (02/07/2023), COVID-19 (02/07/2023), Depression (Unknown), Fibromyalgia, primary, Food intolerance, GERD (gastroesophageal reflux disease) (Unknown), Headache, Hypersomnia, unspecified, Interstitial cystitis, Kidney stone, Low back pain, Mitral valve prolapse, Osteoarthritis, Personal history of other diseases of the digestive system, Personal history of other diseases of the musculoskeletal system and connective tissue, Personal history of other diseases of the musculoskeletal system and connective tissue, Personal history of other diseases of the nervous system and sense organs, Personal history of other endocrine, nutritional and metabolic disease, Personal history of other mental and behavioral disorders, Personal history of other mental and behavioral disorders, Personal history of other specified conditions, Personal history of other specified conditions, Pneumonia (12/2024), Polymyalgia rheumatica (Multi), Psoriatic arthritis (Multi), Sciatica, Spinal  stenosis, Syncope and collapse (2019), Tendinitis, Trigger finger, Urinary incontinence, Urinary tract infection, Urticaria, and Varicella (1966).    Surgical History  She has a past surgical history that includes Other surgical history (2018); Other surgical history (2018); Other surgical history (2018); Other surgical history (2018); Hernia repair (7 years old); Hysterectomy (); Colon surgery (2008);  section, low transverse (1983); Tubal ligation (1983); Cystoscopy (Recent and as  teenager); Kidney transplant; Ablation of dysrhythmic focus; Colonoscopy (); Upper gastrointestinal endoscopy (Unknown); Cataract extraction (?); Elbow surgery; Trigger finger release; Augmentation mammaplasty (Bilateral, ); Fracture surgery; Gastrostomy; and Cosmetic surgery.     Social History  She reports that she has been smoking cigarettes. She has a 40 pack-year smoking history. She has been exposed to tobacco smoke. She has never used smokeless tobacco. She reports current alcohol use. She reports that she does not use drugs.    Family History  Family History   Problem Relation Name Age of Onset    Alcohol abuse Mother Rosa Jha (mother)     Arthritis Mother Rosa Jha (mother)     Depression Mother Rosa Jha (mother)     Miscarriages / Stillbirths Mother Rosa Jha (mother)     Heart disease Mother Rosa Jha (mother)     Heart disease Father Jose Enrique Padgett     Depression Father Jose Enrique Padgett     Alcohol abuse Father Jose Enrique Padgett     Diabetes Sister Lara Antonia ()     Arthritis Sister Lara Antonia ()     Depression Sister Lara Fimejialo ()     Alcohol abuse Sister Elicia Padgett     Arthritis Sister Franny Padgett bovinet     Alcohol abuse Sister Elicia Padgett     Arthritis Sister Franny Padgett bovinet     Depression Sister Franny Padgett bovinet     Hernia Sister Franny Padgett bovinet      Alcohol abuse Sister Elicia Padgett Sister     Depression Sister Elicia Padgett Sister     Diabetes Daughter Taylor Chopra     Diabetes Daughter Taylor Chopra     Depression Daughter Sabina Chopra (self)     Diabetes Maternal Grandmother Rosa Rojasrad     Cancer Maternal Grandmother Rosa Rojasrad     Breast cancer Maternal Grandmother Rosa Ry     Cancer Paternal Grandmother Radha Elkins     Breast cancer Paternal Grandmother Radha Elkins     Urinary tract infection Mother's Sister Jodi Gonzalez     Urinary tract infection Mother's Sister Jodi Gonzalez     Cancer Father's Sister Jennifer Macktus     Breast cancer Father's Sister Jennifer Crowder         Allergies  Adhesive, Cefaclor, Famotidine, Hydromorphone, and Metronidazole      A comprehensive 10+ review of systems was negative except for: see hpi                  Assessment:  69-year-old with fecal incontinence occurring status post subtotal colectomy with ileorectal anastomosis in 2007 for chronic constipation, recurrent UTI, urinary urge incontinence       FI:  -moderate improvement with colestipol 1 mg bid and metamucil  -increase to 2 mg bid, on loperamide and lomotil  -Negligible improvement with SNM  -Continue Colestipol 2 mg bid   -Tried anal pessary without much improvement  -Increased immodium and lomotil  -Saw Dr. Lo, no rectal prolapse  -Stool more solid, wants to try anal bulking  -Can also consider colesevelam PO or  semaglutide  -She is going to see about getting into weight management to get a GLP-1  -Rx Colestipol 3 gm per day down from 5 mg which is constipation  -Rx diphenoxylate-atropine (Lomotil) 2.5-0.025 mg tablet       Shaniqua:  -Completed daily antibiotics.  -continue vaginal estrogen  -no d-mannose, as may worsen FI, could consider methenamine if needed        UUI:  -no improvement with oxybutynin  -Failed gemtesa  -Improved significantly with [email or web address] device      Follow up in 8 weeks        I spent a total of 15 minutes  speaking with the patient on the telephone     All questions and concerns were answered and addressed.  The patient expressed understanding and agrees with the plan.     Osmel Santillan MD    Scribe Attestation  By signing my name below, I, Olya Martinez Scribnilay   attest that this documentation has been prepared under the direction and in the presence of Osmel Santillan MD.

## 2025-03-18 ENCOUNTER — TELEMEDICINE (OUTPATIENT)
Facility: HOSPITAL | Age: 70
End: 2025-03-18
Payer: MEDICARE

## 2025-03-18 DIAGNOSIS — R15.9 INCONTINENCE OF FECES, UNSPECIFIED FECAL INCONTINENCE TYPE: Primary | ICD-10-CM

## 2025-03-18 RX ORDER — COLESTIPOL HYDROCHLORIDE 1 G/1
TABLET ORAL
Qty: 180 TABLET | Refills: 11 | Status: SHIPPED | OUTPATIENT
Start: 2025-03-18

## 2025-03-18 RX ORDER — DIPHENOXYLATE HYDROCHLORIDE AND ATROPINE SULFATE 2.5; .025 MG/1; MG/1
1 TABLET ORAL 4 TIMES DAILY PRN
Qty: 60 TABLET | Refills: 3 | Status: SHIPPED | OUTPATIENT
Start: 2025-03-18 | End: 2026-03-18

## 2025-03-28 ASSESSMENT — ENCOUNTER SYMPTOMS
NAUSEA: 0
HEADACHES: 1
ABDOMINAL PAIN: 1
FREQUENCY: 0
FEVER: 0
HEMATOCHEZIA: 0
ANOREXIA: 1
DIARRHEA: 1
CONSTIPATION: 0
WEIGHT LOSS: 0
MYALGIAS: 1
VOMITING: 0
FLATUS: 0
HEMATURIA: 0
ARTHRALGIAS: 1
BELCHING: 1
DYSURIA: 0

## 2025-03-31 ENCOUNTER — APPOINTMENT (OUTPATIENT)
Dept: PRIMARY CARE | Facility: CLINIC | Age: 70
End: 2025-03-31
Payer: MEDICARE

## 2025-03-31 VITALS
BODY MASS INDEX: 26.4 KG/M2 | DIASTOLIC BLOOD PRESSURE: 80 MMHG | SYSTOLIC BLOOD PRESSURE: 122 MMHG | WEIGHT: 149 LBS | HEIGHT: 63 IN

## 2025-03-31 DIAGNOSIS — F90.9 ATTENTION DEFICIT HYPERACTIVITY DISORDER (ADHD), UNSPECIFIED ADHD TYPE: ICD-10-CM

## 2025-03-31 DIAGNOSIS — F41.1 GENERALIZED ANXIETY DISORDER: ICD-10-CM

## 2025-03-31 DIAGNOSIS — E78.2 MIXED HYPERLIPIDEMIA: ICD-10-CM

## 2025-03-31 DIAGNOSIS — F33.9 MAJOR DEPRESSION, RECURRENT, CHRONIC (CMS-HCC): ICD-10-CM

## 2025-03-31 DIAGNOSIS — Z72.0 TOBACCO ABUSE: ICD-10-CM

## 2025-03-31 DIAGNOSIS — K21.9 GASTROESOPHAGEAL REFLUX DISEASE WITHOUT ESOPHAGITIS: ICD-10-CM

## 2025-03-31 DIAGNOSIS — M81.0 AGE-RELATED OSTEOPOROSIS WITHOUT CURRENT PATHOLOGICAL FRACTURE: ICD-10-CM

## 2025-03-31 DIAGNOSIS — E55.9 VITAMIN D DEFICIENCY: ICD-10-CM

## 2025-03-31 DIAGNOSIS — R73.03 PREDIABETES: ICD-10-CM

## 2025-03-31 DIAGNOSIS — Z00.00 MEDICARE ANNUAL WELLNESS VISIT, SUBSEQUENT: Primary | ICD-10-CM

## 2025-03-31 DIAGNOSIS — R91.8 ABNORMAL CT SCAN OF LUNG: ICD-10-CM

## 2025-03-31 PROCEDURE — 3008F BODY MASS INDEX DOCD: CPT | Performed by: INTERNAL MEDICINE

## 2025-03-31 PROCEDURE — 1159F MED LIST DOCD IN RCRD: CPT | Performed by: INTERNAL MEDICINE

## 2025-03-31 PROCEDURE — 1160F RVW MEDS BY RX/DR IN RCRD: CPT | Performed by: INTERNAL MEDICINE

## 2025-03-31 PROCEDURE — 99214 OFFICE O/P EST MOD 30 MIN: CPT | Performed by: INTERNAL MEDICINE

## 2025-03-31 PROCEDURE — 4004F PT TOBACCO SCREEN RCVD TLK: CPT | Performed by: INTERNAL MEDICINE

## 2025-03-31 PROCEDURE — 1170F FXNL STATUS ASSESSED: CPT | Performed by: INTERNAL MEDICINE

## 2025-03-31 PROCEDURE — G2211 COMPLEX E/M VISIT ADD ON: HCPCS | Performed by: INTERNAL MEDICINE

## 2025-03-31 PROCEDURE — 1124F ACP DISCUSS-NO DSCNMKR DOCD: CPT | Performed by: INTERNAL MEDICINE

## 2025-03-31 PROCEDURE — G0439 PPPS, SUBSEQ VISIT: HCPCS | Performed by: INTERNAL MEDICINE

## 2025-03-31 RX ORDER — ASPIRIN 325 MG
50000 TABLET, DELAYED RELEASE (ENTERIC COATED) ORAL WEEKLY
Qty: 12 CAPSULE | Refills: 0 | Status: SHIPPED | OUTPATIENT
Start: 2025-03-31 | End: 2026-03-31

## 2025-03-31 ASSESSMENT — ACTIVITIES OF DAILY LIVING (ADL)
DOING_HOUSEWORK: INDEPENDENT
MANAGING_FINANCES: INDEPENDENT
DRESSING: INDEPENDENT
BATHING: INDEPENDENT
GROCERY_SHOPPING: INDEPENDENT
TAKING_MEDICATION: INDEPENDENT

## 2025-03-31 ASSESSMENT — PATIENT HEALTH QUESTIONNAIRE - PHQ9
1. LITTLE INTEREST OR PLEASURE IN DOING THINGS: NOT AT ALL
SUM OF ALL RESPONSES TO PHQ9 QUESTIONS 1 AND 2: 0
2. FEELING DOWN, DEPRESSED OR HOPELESS: NOT AT ALL

## 2025-03-31 NOTE — ASSESSMENT & PLAN NOTE
Chief Complaint   Patient presents with    Labs     Patient beign seen for lab draw.     Mary Fuentes presents for lab draw ordered by Austin Mccann RN MSN [facility]-NP    The following labs were drawn and sent to lab by Isai Soria LPN:    CBC, CMP, ISR7V and Lipid panel, TSH reflex T4, Ditamin D, Vitamin B12 folate    The following tubes were sent:    Lavender  ( 2) and Tiger (2)    Patient tolerated procedure well, blood obtained via venipuncture to left antecubital Patient is being managed by psychiatry, Abilify has been added recently

## 2025-03-31 NOTE — PROGRESS NOTES
Subjective   Reason for Visit: Sabina Chopra is an 69 y.o. female here for a Medicare Wellness visit.     Past Medical, Surgical, and Family History reviewed and updated in chart.    Reviewed all medications by prescribing practitioner or clinical pharmacist (such as prescriptions, OTCs, herbal therapies and supplements) and documented in the medical record.    HPI  Patient is a 69-year-old  female with history of multiple medical problems who comes today for subsequent annual Medicare wellness exam.  Medications were reviewed and updated in the medical record, patient is compliant with them and reports no significant side effects.  Patient has history of tobacco abuse for which a low-dose CT of the chest was done-upper lobe opacities were noted and 1 to 3-month follow-up has been recommended.  Labs done since last appointment were reviewed and discussed with patient-hemoglobin A1c slightly up at 5.8, vitamin D decreased at 26 and cholesterol is elevated at 223, LDL was 148, CBC/CMP/TSH and B12 levels were all normal.  Patient has been managed by rheumatology for history of osteoporosis and she was advised to get her DEXA done through them.  Mammogram done a few weeks ago was negative.  Last colonoscopy was done in January 2019 and patient does not get colonoscopies due to history of colectomy.  Patient lives with her , she is independent in ADLs and IADLs and ambulates without aid or assistance.  Patient is up-to-date on vaccinations.  She does see psychiatry for history of attention deficit disorder, sleep disorder, NUBIA and depression for which she is being prescribed medications by them.  Patient continues to smoke up to half a pack of cigarettes per day and has been counseled to quit.  No history of fever, chills, chest pain, shortness of breath, cough, hemoptysis, dizziness, palpitations, syncope, vomiting, melena or rectal bleeding.  Patient has chronic diarrhea and she does follow-up with  "gastroenterology and in fact has an appointment to see a new GI doctor in the near future.  No dysuria, hematuria, headaches, weakness reported.  Patient admits to being under stress and feeling anxious and Abilify has been added by her psychiatrist recently  Patient Care Team:  Anali Ferreira MD as PCP - General (Internal Medicine)     Review of Systems  As per HPI.  Objective   Vitals:  /80 (BP Location: Right arm, Patient Position: Sitting, BP Cuff Size: Large adult)   Ht 1.6 m (5' 3\")   Wt 67.6 kg (149 lb)   BMI 26.39 kg/m²       Physical Exam  General - Patient is a well developed, well appearing, elderly CF in no acute respiratory distress  Head - normocephalic and atraumatic  Eyes - no pallor or icterus and normal extraocular movements  ENT - normal EACs and TMs, throat clear with no exudates  Neck - supple, no JVD, thyromegaly or lymphadenopathy  Heart - normal rate and rhythm with no murmurs  Lungs - clear to auscultation bilaterally, decreased air entry B/L  Abdomen - soft, non-tender, normal BS with no masses or organomegaly  Extremities - no pedal edema  Skin - no rashes or ulcers  Neuro - grossly normal with no focal neurological deficits  Psych - normal mental status, mood and affect  MSK - normal gait with grossly normal ROM of major joints   Assessment & Plan  Medicare annual wellness visit, subsequent  Patient is up-to-date on labs and immunizations, she will get DEXA done through her rheumatologist, she will discuss with her gastroenterologist regarding flex sig as she does not get colonoscopies anymore due to history of colectomy       Mixed hyperlipidemia  Patient declines drug therapy and would like to watch her diet, fasting lipids will be checked at follow-up       Prediabetes  Patient will watch the intake of carbohydrates and sweets and hemoglobin A1c will be repeated at follow-up       Vitamin D deficiency    Orders:    cholecalciferol (Vitamin D-3) 1.25 mg (50,000 units) " capsule; Take 1 capsule (50,000 Units) by mouth 1 (one) time per week.    Major depression, recurrent, chronic (CMS-HCC)  Patient is being managed by psychiatry and will follow-up with them as recommended       Attention deficit hyperactivity disorder (ADHD), unspecified ADHD type  Patient is on Adderall per psych       Gastroesophageal reflux disease without esophagitis  Patient is on PPI therapy and will see her new gastroenterologist       Tobacco abuse  Patient has been counseled to stop smoking cigarettes and low-dose CT of the chest is already been done for screening purposes       Age-related osteoporosis without current pathological fracture  Patient is on Reclast and will follow-up with rheumatology as recommended       Abnormal CT scan of lung  Ordered for follow-up CT of the chest has been placed and patient  has an appointment for next month       Generalized anxiety disorder  Patient is being managed by psychiatry, Abilify has been added recently       Follow-up in August 2025 at which time hemoglobin A1c and lipids will be repeated.  35 minutes spent rooming the patient, reviewing records, eliciting history, examining patient, counseling, coordination of care and in documentation.  This note was partially generated using the Dragon voice recognition system. There may be some incorrect words, spelling and punctuation errors that were not corrected prior to committing the note to the patient's medical record.

## 2025-03-31 NOTE — ASSESSMENT & PLAN NOTE
Orders:    cholecalciferol (Vitamin D-3) 1.25 mg (50,000 units) capsule; Take 1 capsule (50,000 Units) by mouth 1 (one) time per week.

## 2025-03-31 NOTE — ASSESSMENT & PLAN NOTE
Patient declines drug therapy and would like to watch her diet, fasting lipids will be checked at follow-up

## 2025-03-31 NOTE — ASSESSMENT & PLAN NOTE
Patient is up-to-date on labs and immunizations, she will get DEXA done through her rheumatologist, she will discuss with her gastroenterologist regarding flex sig as she does not get colonoscopies anymore due to history of colectomy

## 2025-04-02 ENCOUNTER — APPOINTMENT (OUTPATIENT)
Facility: CLINIC | Age: 70
End: 2025-04-02
Payer: MEDICARE

## 2025-04-16 ENCOUNTER — HOSPITAL ENCOUNTER (OUTPATIENT)
Dept: CARDIOLOGY | Facility: HOSPITAL | Age: 70
Discharge: HOME | End: 2025-04-16
Payer: MEDICARE

## 2025-04-16 DIAGNOSIS — R55 SYNCOPE AND COLLAPSE: ICD-10-CM

## 2025-04-16 DIAGNOSIS — Z95.818 IMPLANTABLE LOOP RECORDER PRESENT: ICD-10-CM

## 2025-04-22 DIAGNOSIS — R15.1 FECAL SOILING: ICD-10-CM

## 2025-04-23 RX ORDER — LOPERAMIDE HYDROCHLORIDE 2 MG/1
CAPSULE ORAL
Qty: 80 CAPSULE | Refills: 0 | Status: SHIPPED | OUTPATIENT
Start: 2025-04-23

## 2025-05-05 DIAGNOSIS — R15.1 FECAL SOILING: ICD-10-CM

## 2025-05-05 DIAGNOSIS — R15.9 INCONTINENCE OF FECES, UNSPECIFIED FECAL INCONTINENCE TYPE: ICD-10-CM

## 2025-05-05 RX ORDER — LOPERAMIDE HYDROCHLORIDE 2 MG/1
2 CAPSULE ORAL 4 TIMES DAILY PRN
Qty: 80 CAPSULE | Refills: 3 | Status: SHIPPED | OUTPATIENT
Start: 2025-05-05 | End: 2026-05-05

## 2025-05-05 RX ORDER — DIPHENOXYLATE HYDROCHLORIDE AND ATROPINE SULFATE 2.5; .025 MG/1; MG/1
1 TABLET ORAL 4 TIMES DAILY PRN
Qty: 90 TABLET | Refills: 3 | Status: SHIPPED | OUTPATIENT
Start: 2025-05-05 | End: 2026-05-05

## 2025-05-05 RX ORDER — COLESTIPOL HYDROCHLORIDE 1 G/1
TABLET ORAL
Qty: 180 TABLET | Refills: 11 | Status: SHIPPED | OUTPATIENT
Start: 2025-05-05

## 2025-05-13 ENCOUNTER — TELEMEDICINE (OUTPATIENT)
Facility: HOSPITAL | Age: 70
End: 2025-05-13
Payer: MEDICARE

## 2025-05-13 DIAGNOSIS — R15.9 INCONTINENCE OF FECES, UNSPECIFIED FECAL INCONTINENCE TYPE: Primary | ICD-10-CM

## 2025-05-16 ENCOUNTER — APPOINTMENT (OUTPATIENT)
Dept: RHEUMATOLOGY | Facility: CLINIC | Age: 70
End: 2025-05-16
Payer: MEDICARE

## 2025-05-19 ENCOUNTER — APPOINTMENT (OUTPATIENT)
Dept: RHEUMATOLOGY | Facility: CLINIC | Age: 70
End: 2025-05-19
Payer: MEDICARE

## 2025-05-19 VITALS
OXYGEN SATURATION: 94 % | HEIGHT: 63 IN | HEART RATE: 78 BPM | BODY MASS INDEX: 26.47 KG/M2 | SYSTOLIC BLOOD PRESSURE: 129 MMHG | TEMPERATURE: 96.5 F | WEIGHT: 149.4 LBS | DIASTOLIC BLOOD PRESSURE: 84 MMHG

## 2025-05-19 DIAGNOSIS — M35.3 PMR (POLYMYALGIA RHEUMATICA) (MULTI): ICD-10-CM

## 2025-05-19 DIAGNOSIS — M81.0 AGE-RELATED OSTEOPOROSIS WITHOUT CURRENT PATHOLOGICAL FRACTURE: Primary | ICD-10-CM

## 2025-05-19 PROCEDURE — 1159F MED LIST DOCD IN RCRD: CPT | Performed by: INTERNAL MEDICINE

## 2025-05-19 PROCEDURE — 99214 OFFICE O/P EST MOD 30 MIN: CPT | Performed by: INTERNAL MEDICINE

## 2025-05-19 PROCEDURE — 4004F PT TOBACCO SCREEN RCVD TLK: CPT | Performed by: INTERNAL MEDICINE

## 2025-05-19 PROCEDURE — 1125F AMNT PAIN NOTED PAIN PRSNT: CPT | Performed by: INTERNAL MEDICINE

## 2025-05-19 PROCEDURE — 3008F BODY MASS INDEX DOCD: CPT | Performed by: INTERNAL MEDICINE

## 2025-05-19 PROCEDURE — 1160F RVW MEDS BY RX/DR IN RCRD: CPT | Performed by: INTERNAL MEDICINE

## 2025-05-19 ASSESSMENT — PAIN SCALES - GENERAL: PAINLEVEL_OUTOF10: 4

## 2025-05-19 NOTE — PROGRESS NOTES
She notes having had increased musculoskeletal pain a few weeks ago that subsequently resolved.  She had some problems with balance has not had any recent fractures.  She is being followed for lung nodules noted on low-dose screening CT scan.  She has noted lower back pain that is worse with movement and improves with rest.  She also notes pain to the lateral aspect of her hips that extend into the hips anteriorly.  She has problems with fecal incontinence does not completely control the neurostimulator implant.  She is status post subtotal colectomy for hypomotility.  She has not noted any side effects from the zoledronic acid infusions.  The next zoledronic acid infusion is planned for 9/17/2025.  She has had vaginal gas inclusions on 11/2021, 9/27/2023, 9/17/2024.    The lungs, heart, abdomen, and extremities are benign.  The neurological examination shows preserved muscle strength in the upper and lower extremities.  The musculoskeletal examination does not show any joint effusions.  There is preserved passive range of motion of the right and lower extremity joints.    CT lung screening low-dose (3/12/2025) mild paraseptal emphysematous changes.  Several subcentimeter parenchymal nodular densities in the right upper lobe the largest measuring 8 mm.  Scattered mediastinal lymph nodes.  Mild atherosclerotic changes of the thoracic aorta.  Estimated coronary artery calcium score 0.  Exophytic left renal lesions consistent with renal cysts.  Nonobstructing renal calcifications.  Status post bilateral breast prostheses.    Laboratory (2/14/2025) Glucose 106, BUN 14, creatinine 0.84, calcium 9.4, albumin 4.0, alkaline phosphatase 75, AST 17, ALT 14, WBC 8.2, hemoglobin 13.1, hematocrit 41.4, , MCHC 31.6, platelets 301, vitamin B12 459, 25-hydroxy vitamin D 26, TSH 1.57, hemoglobin A1c 5.8%, CRP 40.8, ESR 36.    DEXA bone density (9/21/2021) L1-L4 T-score is 0.0, left femoral neck T-score -1.8, left total femur  T-score -1.6, right femoral neck T-score -2.1, right total femur T-score -2.1.     69-year-old female with past history of polymyalgia rheumatica, positive HLA-B27, hypothyroidism, past history of recurrent bullous skin lesions and recurrent mouth sores, status post subtotal colectomy for hypomotility, has fecal incontinence, osteoarthritis of the lumbar spine, history of tobacco smoking, she is on antiresorptive therapy with risedronate.    She is to continue with plans for the fourth Prolia injection 9/17/2025.  She is to have laboratory for CRP, comprehensive metabolic panel, and magnesium prior to the next Prolia injection.  She is referred for DEXA bone density for follow-up osteopenia.  She is to return for follow-up evaluation at the next available office appointment.  She is to do stretching exercises to the lower back.

## 2025-05-23 PROCEDURE — 84155 ASSAY OF PROTEIN SERUM: CPT

## 2025-05-23 PROCEDURE — 36415 COLL VENOUS BLD VENIPUNCTURE: CPT

## 2025-05-24 ENCOUNTER — LAB (OUTPATIENT)
Dept: LAB | Facility: HOSPITAL | Age: 70
End: 2025-05-24
Payer: MEDICARE

## 2025-05-24 DIAGNOSIS — M81.0 AGE-RELATED OSTEOPOROSIS WITHOUT CURRENT PATHOLOGICAL FRACTURE: Primary | ICD-10-CM

## 2025-05-24 LAB
ALBUMIN SERPL-MCNC: 4.2 G/DL (ref 3.6–5.1)
ALP SERPL-CCNC: 111 U/L (ref 37–153)
ALT SERPL-CCNC: 46 U/L (ref 6–29)
ANION GAP SERPL CALCULATED.4IONS-SCNC: 9 MMOL/L (CALC) (ref 7–17)
AST SERPL-CCNC: 42 U/L (ref 10–35)
BILIRUB SERPL-MCNC: 0.5 MG/DL (ref 0.2–1.2)
BUN SERPL-MCNC: 17 MG/DL (ref 7–25)
CALCIUM SERPL-MCNC: 9.6 MG/DL (ref 8.6–10.4)
CHLORIDE SERPL-SCNC: 105 MMOL/L (ref 98–110)
CO2 SERPL-SCNC: 24 MMOL/L (ref 20–32)
CREAT SERPL-MCNC: 1.11 MG/DL (ref 0.5–1.05)
EGFRCR SERPLBLD CKD-EPI 2021: 54 ML/MIN/1.73M2
GLUCOSE SERPL-MCNC: 79 MG/DL (ref 65–99)
MAGNESIUM SERPL-MCNC: 2 MG/DL (ref 1.5–2.5)
POTASSIUM SERPL-SCNC: 5.1 MMOL/L (ref 3.5–5.3)
PROT SERPL-MCNC: 7.1 G/DL (ref 6.1–8.1)
PROT SERPL-MCNC: 7.4 G/DL (ref 6.4–8.2)
SODIUM SERPL-SCNC: 138 MMOL/L (ref 135–146)

## 2025-05-27 ENCOUNTER — APPOINTMENT (OUTPATIENT)
Dept: RHEUMATOLOGY | Facility: CLINIC | Age: 70
End: 2025-05-27
Payer: MEDICARE

## 2025-05-27 ENCOUNTER — HOSPITAL ENCOUNTER (OUTPATIENT)
Dept: CARDIOLOGY | Facility: HOSPITAL | Age: 70
Discharge: HOME | End: 2025-05-27
Payer: MEDICARE

## 2025-05-27 VITALS
HEART RATE: 89 BPM | WEIGHT: 149 LBS | OXYGEN SATURATION: 95 % | BODY MASS INDEX: 26.39 KG/M2 | DIASTOLIC BLOOD PRESSURE: 75 MMHG | SYSTOLIC BLOOD PRESSURE: 118 MMHG | TEMPERATURE: 98.1 F

## 2025-05-27 DIAGNOSIS — I49.9 CARDIAC ARRHYTHMIA, UNSPECIFIED CARDIAC ARRHYTHMIA TYPE: ICD-10-CM

## 2025-05-27 DIAGNOSIS — M70.61 GREATER TROCHANTERIC BURSITIS OF BOTH HIPS: Primary | ICD-10-CM

## 2025-05-27 DIAGNOSIS — R55 SYNCOPE AND COLLAPSE: ICD-10-CM

## 2025-05-27 DIAGNOSIS — M81.0 OSTEOPOROSIS, UNSPECIFIED OSTEOPOROSIS TYPE, UNSPECIFIED PATHOLOGICAL FRACTURE PRESENCE: Primary | ICD-10-CM

## 2025-05-27 DIAGNOSIS — M70.62 GREATER TROCHANTERIC BURSITIS OF BOTH HIPS: Primary | ICD-10-CM

## 2025-05-27 LAB — CRP SERPL-MCNC: 9.5 MG/L

## 2025-05-27 PROCEDURE — 93298 REM INTERROG DEV EVAL SCRMS: CPT | Performed by: STUDENT IN AN ORGANIZED HEALTH CARE EDUCATION/TRAINING PROGRAM

## 2025-05-27 PROCEDURE — 4004F PT TOBACCO SCREEN RCVD TLK: CPT | Performed by: INTERNAL MEDICINE

## 2025-05-27 PROCEDURE — 1160F RVW MEDS BY RX/DR IN RCRD: CPT | Performed by: INTERNAL MEDICINE

## 2025-05-27 PROCEDURE — 1159F MED LIST DOCD IN RCRD: CPT | Performed by: INTERNAL MEDICINE

## 2025-05-27 PROCEDURE — 93298 REM INTERROG DEV EVAL SCRMS: CPT

## 2025-05-27 RX ORDER — ZOLEDRONIC ACID 5 MG/100ML
5 INJECTION, SOLUTION INTRAVENOUS ONCE
Qty: 100 ML | Refills: 0 | Status: SHIPPED
Start: 2025-05-27 | End: 2025-05-27

## 2025-05-27 RX ORDER — ZOLEDRONIC ACID 5 MG/100ML
5 INJECTION, SOLUTION INTRAVENOUS ONCE
OUTPATIENT
Start: 2025-05-27

## 2025-05-27 NOTE — PROGRESS NOTES
Reclast continuation of therapy, updated therapy plan renewed. Next infusion scheduled on 9/17/25 at the [facility]

## 2025-05-27 NOTE — PROGRESS NOTES
Patient ID: Sabina Chopra is a 69 y.o. female.    Procedures  Bilateral greater trochanteric bursa injections under aseptic technique.  Triamcinolone 20 mg with 1 mL of 1% lidocaine injected to the right and left greater trochanteric bursa using a 1-1/2 inch 22-gauge needle.  There were no immediate complications.    Chest CT scan (3/12/2025) several right upper lobe subcentimeter parenchymal opacities.  There are no coronary artery calcifications.    Laboratory (5/23/2025) magnesium 2.0, BUN 17, creatinine 1.11, glucose 79, calcium 9.6, albumin 4.2, alkaline phosphatase 111, AST 42, ALT 46.

## 2025-05-28 PROBLEM — R76.8 ANTINUCLEAR ANTIBODY (ANA) POSITIVE: Status: ACTIVE | Noted: 2022-06-23

## 2025-05-28 RX ORDER — MODAFINIL 200 MG/1
200 TABLET ORAL DAILY
COMMUNITY
Start: 2025-04-24

## 2025-06-01 LAB
ALBUMIN: 4.2 G/DL (ref 3.4–5)
ALPHA 1 GLOBULIN: 0.4 G/DL (ref 0.2–0.6)
ALPHA 2 GLOBULIN: 1 G/DL (ref 0.4–1.1)
BETA GLOBULIN: 0.9 G/DL (ref 0.5–1.2)
GAMMA GLOBULIN: 1 G/DL (ref 0.5–1.4)
IMMUNOFIXATION COMMENT: NORMAL
PATH REVIEW - SERUM IMMUNOFIXATION: NORMAL
PATH REVIEW-SERUM PROTEIN ELECTROPHORESIS: NORMAL
PROTEIN ELECTROPHORESIS COMMENT: NORMAL

## 2025-06-11 ENCOUNTER — TELEPHONE (OUTPATIENT)
Dept: CARDIOLOGY | Facility: HOSPITAL | Age: 70
End: 2025-06-11
Payer: MEDICARE

## 2025-06-12 ENCOUNTER — APPOINTMENT (OUTPATIENT)
Dept: CARDIOLOGY | Facility: CLINIC | Age: 70
End: 2025-06-12
Payer: MEDICARE

## 2025-06-13 ENCOUNTER — TELEPHONE (OUTPATIENT)
Dept: CARDIOLOGY | Facility: HOSPITAL | Age: 70
End: 2025-06-13
Payer: MEDICARE

## 2025-06-13 NOTE — TELEPHONE ENCOUNTER
Patient sent [employer] message, was not able to make appointment yesterday. Called patient and left voicemail trying to get rescheduled. I instructed patient to please give our office a call back when they can to reschedule this appointment.

## 2025-06-18 ENCOUNTER — TELEPHONE (OUTPATIENT)
Dept: RADIOLOGY | Facility: HOSPITAL | Age: 70
End: 2025-06-18
Payer: MEDICARE

## 2025-06-18 NOTE — TELEPHONE ENCOUNTER
VM message left requesting patient to call and re-schedule the follow up Lung Cancer Screening exam. Requested they schedule with radiology @ [phone].Encouraged  to return my call @  [phone] for any additional assistance.

## 2025-06-20 ENCOUNTER — TELEPHONE (OUTPATIENT)
Dept: RADIOLOGY | Facility: HOSPITAL | Age: 70
End: 2025-06-20
Payer: MEDICARE

## 2025-06-20 NOTE — TELEPHONE ENCOUNTER
CT LUNG SCREENING LOW DOSE; 3-  Lung-RADS 0  Recommendation: Follow up Low Dose Chest CT in 1-3 months,  Due: 6-      Good afternoon,  I wanted to inform you that Sabina Chopra has not yet scheduled their recommended follow up lung cancer screening exam despite multiple outreach attempts and reminders from  the Lung Cancer Screening Team.  At your convenience please have your office follow up as needed.    Thank you,    [facility]  [phone]

## 2025-06-24 DIAGNOSIS — Z86.79 HISTORY OF PSVT (PAROXYSMAL SUPRAVENTRICULAR TACHYCARDIA): ICD-10-CM

## 2025-06-30 ENCOUNTER — HOSPITAL ENCOUNTER (OUTPATIENT)
Dept: RADIOLOGY | Facility: CLINIC | Age: 70
Discharge: HOME | End: 2025-06-30
Payer: MEDICARE

## 2025-06-30 ENCOUNTER — APPOINTMENT (OUTPATIENT)
Dept: RADIOLOGY | Facility: CLINIC | Age: 70
End: 2025-06-30
Payer: MEDICARE

## 2025-06-30 DIAGNOSIS — M81.0 AGE-RELATED OSTEOPOROSIS WITHOUT CURRENT PATHOLOGICAL FRACTURE: ICD-10-CM

## 2025-06-30 DIAGNOSIS — R94.2 ABNORMAL LUNG SCAN: ICD-10-CM

## 2025-06-30 PROCEDURE — 77080 DXA BONE DENSITY AXIAL: CPT | Performed by: RADIOLOGY

## 2025-06-30 PROCEDURE — 77080 DXA BONE DENSITY AXIAL: CPT

## 2025-06-30 PROCEDURE — 76380 CAT SCAN FOLLOW-UP STUDY: CPT | Performed by: RADIOLOGY

## 2025-06-30 PROCEDURE — 71250 CT THORAX DX C-: CPT

## 2025-07-01 ENCOUNTER — TELEPHONE (OUTPATIENT)
Dept: UROLOGY | Facility: CLINIC | Age: 70
End: 2025-07-01
Payer: MEDICARE

## 2025-07-01 RX ORDER — PROPRANOLOL HYDROCHLORIDE 10 MG/1
10 TABLET ORAL DAILY
Qty: 90 TABLET | Refills: 0 | Status: SHIPPED | OUTPATIENT
Start: 2025-07-01

## 2025-07-01 NOTE — TELEPHONE ENCOUNTER
Attempted to contact patient to schedule Virtual Visit with DS for August 2025. Left office phone number and business hours. Asked patient to call back to schedule.

## 2025-07-02 ENCOUNTER — TELEPHONE (OUTPATIENT)
Dept: CARDIOLOGY | Facility: HOSPITAL | Age: 70
End: 2025-07-02
Payer: MEDICARE

## 2025-07-02 NOTE — TELEPHONE ENCOUNTER
Patient needs to be scheduled for follow up with  - called and left vm for patient to please return our phone call

## 2025-07-11 ENCOUNTER — HOSPITAL ENCOUNTER (OUTPATIENT)
Dept: CARDIOLOGY | Facility: HOSPITAL | Age: 70
Discharge: HOME | End: 2025-07-11
Payer: MEDICARE

## 2025-07-11 DIAGNOSIS — I49.9 CARDIAC ARRHYTHMIA, UNSPECIFIED CARDIAC ARRHYTHMIA TYPE: ICD-10-CM

## 2025-07-11 DIAGNOSIS — R55 SYNCOPE AND COLLAPSE: ICD-10-CM

## 2025-07-15 ENCOUNTER — HOSPITAL ENCOUNTER (OUTPATIENT)
Dept: CARDIOLOGY | Facility: HOSPITAL | Age: 70
Discharge: HOME | End: 2025-07-15
Payer: MEDICARE

## 2025-07-15 DIAGNOSIS — I49.9 CARDIAC ARRHYTHMIA, UNSPECIFIED CARDIAC ARRHYTHMIA TYPE: ICD-10-CM

## 2025-07-15 DIAGNOSIS — R55 SYNCOPE AND COLLAPSE: ICD-10-CM

## 2025-07-22 ENCOUNTER — HOSPITAL ENCOUNTER (OUTPATIENT)
Dept: CARDIOLOGY | Facility: HOSPITAL | Age: 70
Discharge: HOME | End: 2025-07-22
Payer: MEDICARE

## 2025-07-22 DIAGNOSIS — R55 SYNCOPE AND COLLAPSE: ICD-10-CM

## 2025-07-22 DIAGNOSIS — Z95.818 IMPLANTABLE LOOP RECORDER PRESENT: ICD-10-CM

## 2025-07-28 DIAGNOSIS — K21.9 GASTROESOPHAGEAL REFLUX DISEASE WITHOUT ESOPHAGITIS: ICD-10-CM

## 2025-07-29 RX ORDER — PANTOPRAZOLE SODIUM 40 MG/1
40 TABLET, DELAYED RELEASE ORAL DAILY
Qty: 30 TABLET | Refills: 0 | OUTPATIENT
Start: 2025-07-29

## 2025-07-31 ENCOUNTER — TELEPHONE (OUTPATIENT)
Dept: UROLOGY | Facility: CLINIC | Age: 70
End: 2025-07-31
Payer: MEDICARE

## 2025-07-31 DIAGNOSIS — R15.9 INCONTINENCE OF FECES, UNSPECIFIED FECAL INCONTINENCE TYPE: ICD-10-CM

## 2025-07-31 NOTE — TELEPHONE ENCOUNTER
[facility] questioniong Colestipol 3/day,  says she takes 4/day, they wanting updated script if you want her to do 4/day  [facility] [place]

## 2025-08-01 RX ORDER — COLESTIPOL HYDROCHLORIDE 1 G/1
TABLET ORAL
Qty: 180 TABLET | Refills: 11 | Status: SHIPPED | OUTPATIENT
Start: 2025-08-01

## 2025-08-03 RX ORDER — PANTOPRAZOLE SODIUM 40 MG/1
40 TABLET, DELAYED RELEASE ORAL DAILY
Qty: 30 TABLET | Refills: 0 | Status: SHIPPED | OUTPATIENT
Start: 2025-08-03

## 2025-08-03 NOTE — TELEPHONE ENCOUNTER
Patient not seen since by GI (Dr. Artis) since 2023. Previous EGD in 2021 showed changes of Rose's Esophagus. No upcoming GI appointments scheduled in the EMR.    30 day Rx sent to pharmacy. Future refills should be handled by her PCP or she will need to re-establish care with GI for any further refills.

## 2025-08-19 ENCOUNTER — APPOINTMENT (OUTPATIENT)
Dept: UROLOGY | Facility: CLINIC | Age: 70
End: 2025-08-19
Payer: MEDICARE

## 2025-08-19 ENCOUNTER — OFFICE VISIT (OUTPATIENT)
Dept: CARDIOLOGY | Facility: HOSPITAL | Age: 70
End: 2025-08-19
Payer: MEDICARE

## 2025-08-19 VITALS
WEIGHT: 148 LBS | HEART RATE: 86 BPM | DIASTOLIC BLOOD PRESSURE: 90 MMHG | HEIGHT: 63 IN | BODY MASS INDEX: 26.22 KG/M2 | SYSTOLIC BLOOD PRESSURE: 124 MMHG

## 2025-08-19 DIAGNOSIS — Z86.79 HISTORY OF PSVT (PAROXYSMAL SUPRAVENTRICULAR TACHYCARDIA): ICD-10-CM

## 2025-08-19 DIAGNOSIS — Z91.199 NO-SHOW FOR APPOINTMENT: Primary | ICD-10-CM

## 2025-08-19 DIAGNOSIS — E55.9 VITAMIN D DEFICIENCY: ICD-10-CM

## 2025-08-19 DIAGNOSIS — F17.218 CIGARETTE NICOTINE DEPENDENCE WITH OTHER NICOTINE-INDUCED DISORDER: ICD-10-CM

## 2025-08-19 LAB
ATRIAL RATE: 86 BPM
P AXIS: 29 DEGREES
P OFFSET: 148 MS
P ONSET: 90 MS
PR INTERVAL: 276 MS
Q ONSET: 228 MS
QRS COUNT: 14 BEATS
QRS DURATION: 64 MS
QT INTERVAL: 358 MS
QTC CALCULATION(BAZETT): 428 MS
QTC FREDERICIA: 403 MS
R AXIS: 32 DEGREES
T AXIS: 33 DEGREES
T OFFSET: 407 MS
VENTRICULAR RATE: 86 BPM

## 2025-08-19 PROCEDURE — 3008F BODY MASS INDEX DOCD: CPT | Performed by: INTERNAL MEDICINE

## 2025-08-19 PROCEDURE — 1159F MED LIST DOCD IN RCRD: CPT | Performed by: INTERNAL MEDICINE

## 2025-08-19 PROCEDURE — 99214 OFFICE O/P EST MOD 30 MIN: CPT | Performed by: INTERNAL MEDICINE

## 2025-08-19 PROCEDURE — 1160F RVW MEDS BY RX/DR IN RCRD: CPT | Performed by: INTERNAL MEDICINE

## 2025-08-19 PROCEDURE — 93005 ELECTROCARDIOGRAM TRACING: CPT | Performed by: INTERNAL MEDICINE

## 2025-08-19 PROCEDURE — 99213 OFFICE O/P EST LOW 20 MIN: CPT

## 2025-08-19 RX ORDER — PROPRANOLOL HYDROCHLORIDE 10 MG/1
10 TABLET ORAL 2 TIMES DAILY
Qty: 90 TABLET | Refills: 0 | Status: SHIPPED | OUTPATIENT
Start: 2025-08-19

## 2025-08-19 RX ORDER — ASPIRIN 325 MG
1.25 TABLET, DELAYED RELEASE (ENTERIC COATED) ORAL
Qty: 12 CAPSULE | Refills: 0 | OUTPATIENT
Start: 2025-08-19

## 2025-08-19 RX ORDER — IBUPROFEN 200 MG
1 TABLET ORAL EVERY 24 HOURS
Qty: 30 PATCH | Refills: 0 | Status: SHIPPED | OUTPATIENT
Start: 2025-08-19 | End: 2025-09-18

## 2025-08-19 RX ORDER — ACETAMINOPHEN 500 MG
TABLET ORAL
Qty: 1 KIT | Refills: 0 | Status: SHIPPED | OUTPATIENT
Start: 2025-08-19

## 2025-08-19 RX ORDER — PROPRANOLOL HYDROCHLORIDE 10 MG/1
10 TABLET ORAL
Qty: 90 TABLET | Refills: 0 | OUTPATIENT
Start: 2025-08-19

## 2025-08-19 ASSESSMENT — ENCOUNTER SYMPTOMS
OCCASIONAL FEELINGS OF UNSTEADINESS: 0
DEPRESSION: 1
LOSS OF SENSATION IN FEET: 0

## 2025-08-26 ENCOUNTER — APPOINTMENT (OUTPATIENT)
Dept: PRIMARY CARE | Facility: CLINIC | Age: 70
End: 2025-08-26
Payer: MEDICARE

## 2025-08-26 VITALS
BODY MASS INDEX: 26.05 KG/M2 | DIASTOLIC BLOOD PRESSURE: 60 MMHG | WEIGHT: 147 LBS | SYSTOLIC BLOOD PRESSURE: 106 MMHG | HEIGHT: 63 IN

## 2025-08-26 DIAGNOSIS — E78.2 MIXED HYPERLIPIDEMIA: Primary | ICD-10-CM

## 2025-08-26 DIAGNOSIS — Z72.0 TOBACCO ABUSE: ICD-10-CM

## 2025-08-26 DIAGNOSIS — M81.0 AGE-RELATED OSTEOPOROSIS WITHOUT CURRENT PATHOLOGICAL FRACTURE: ICD-10-CM

## 2025-08-26 DIAGNOSIS — R73.03 PREDIABETES: ICD-10-CM

## 2025-08-26 DIAGNOSIS — K21.9 GASTROESOPHAGEAL REFLUX DISEASE WITHOUT ESOPHAGITIS: ICD-10-CM

## 2025-08-26 DIAGNOSIS — E55.9 VITAMIN D DEFICIENCY: ICD-10-CM

## 2025-08-26 DIAGNOSIS — R15.9 INCONTINENCE OF FECES, UNSPECIFIED FECAL INCONTINENCE TYPE: ICD-10-CM

## 2025-08-26 DIAGNOSIS — R91.1 PULMONARY NODULE: ICD-10-CM

## 2025-08-26 PROCEDURE — G2211 COMPLEX E/M VISIT ADD ON: HCPCS | Performed by: INTERNAL MEDICINE

## 2025-08-26 PROCEDURE — 3008F BODY MASS INDEX DOCD: CPT | Performed by: INTERNAL MEDICINE

## 2025-08-26 PROCEDURE — 1159F MED LIST DOCD IN RCRD: CPT | Performed by: INTERNAL MEDICINE

## 2025-08-26 PROCEDURE — 1160F RVW MEDS BY RX/DR IN RCRD: CPT | Performed by: INTERNAL MEDICINE

## 2025-08-26 PROCEDURE — 99214 OFFICE O/P EST MOD 30 MIN: CPT | Performed by: INTERNAL MEDICINE

## 2025-09-03 DIAGNOSIS — K21.9 GASTROESOPHAGEAL REFLUX DISEASE WITHOUT ESOPHAGITIS: Primary | ICD-10-CM

## 2025-09-03 DIAGNOSIS — R06.09 OTHER FORM OF DYSPNEA: Primary | ICD-10-CM

## 2025-09-03 RX ORDER — PANTOPRAZOLE SODIUM 40 MG/1
40 TABLET, DELAYED RELEASE ORAL DAILY
Qty: 30 TABLET | Refills: 1 | Status: SHIPPED | OUTPATIENT
Start: 2025-09-03

## 2025-09-17 ENCOUNTER — APPOINTMENT (OUTPATIENT)
Dept: INFUSION THERAPY | Facility: CLINIC | Age: 70
End: 2025-09-17
Payer: MEDICARE

## 2025-09-29 ENCOUNTER — APPOINTMENT (OUTPATIENT)
Facility: CLINIC | Age: 70
End: 2025-09-29
Payer: MEDICARE

## 2025-12-09 ENCOUNTER — APPOINTMENT (OUTPATIENT)
Dept: RHEUMATOLOGY | Facility: CLINIC | Age: 70
End: 2025-12-09
Payer: MEDICARE

## 2026-02-12 ENCOUNTER — APPOINTMENT (OUTPATIENT)
Dept: PRIMARY CARE | Facility: CLINIC | Age: 71
End: 2026-02-12
Payer: MEDICARE

## (undated) DEVICE — SYRINGE, 10 CC, LUER LOCK

## (undated) DEVICE — PREP TRAY, SKIN, DRY, W/GLOVES

## (undated) DEVICE — COVER, TABLE